# Patient Record
Sex: MALE | Race: BLACK OR AFRICAN AMERICAN | Employment: UNEMPLOYED | URBAN - METROPOLITAN AREA
[De-identification: names, ages, dates, MRNs, and addresses within clinical notes are randomized per-mention and may not be internally consistent; named-entity substitution may affect disease eponyms.]

---

## 2021-02-10 ENCOUNTER — HOSPITAL ENCOUNTER (EMERGENCY)
Age: 41
Discharge: HOME | End: 2021-02-11
Attending: EMERGENCY MEDICINE
Payer: COMMERCIAL

## 2021-02-10 DIAGNOSIS — R45.851 SUICIDAL IDEATION: Primary | ICD-10-CM

## 2021-02-10 LAB
AMPHET UR QL SCN: NEGATIVE
ANION GAP SERPL CALC-SCNC: 3 MMOL/L (ref 3–18)
BARBITURATES UR QL SCN: NEGATIVE
BASOPHILS # BLD: 0 K/UL (ref 0–0.1)
BASOPHILS NFR BLD: 0 % (ref 0–2)
BENZODIAZ UR QL: NEGATIVE
BUN SERPL-MCNC: 10 MG/DL (ref 7–18)
BUN/CREAT SERPL: 10 (ref 12–20)
CALCIUM SERPL-MCNC: 9.5 MG/DL (ref 8.5–10.1)
CANNABINOIDS UR QL SCN: POSITIVE
CHLORIDE SERPL-SCNC: 103 MMOL/L (ref 100–111)
CO2 SERPL-SCNC: 30 MMOL/L (ref 21–32)
COCAINE UR QL SCN: POSITIVE
COVID-19 RAPID TEST, COVR: NOT DETECTED
CREAT SERPL-MCNC: 1.01 MG/DL (ref 0.6–1.3)
DIFFERENTIAL METHOD BLD: NORMAL
EOSINOPHIL # BLD: 0.2 K/UL (ref 0–0.4)
EOSINOPHIL NFR BLD: 2 % (ref 0–5)
ERYTHROCYTE [DISTWIDTH] IN BLOOD BY AUTOMATED COUNT: 14.2 % (ref 11.6–14.5)
ETHANOL SERPL-MCNC: <3 MG/DL (ref 0–3)
GLUCOSE SERPL-MCNC: 90 MG/DL (ref 74–99)
HCT VFR BLD AUTO: 44.7 % (ref 36–48)
HDSCOM,HDSCOM: ABNORMAL
HGB BLD-MCNC: 14.3 G/DL (ref 13–16)
LYMPHOCYTES # BLD: 2.3 K/UL (ref 0.9–3.6)
LYMPHOCYTES NFR BLD: 26 % (ref 21–52)
MCH RBC QN AUTO: 30 PG (ref 24–34)
MCHC RBC AUTO-ENTMCNC: 32 G/DL (ref 31–37)
MCV RBC AUTO: 93.7 FL (ref 74–97)
METHADONE UR QL: NEGATIVE
MONOCYTES # BLD: 0.6 K/UL (ref 0.05–1.2)
MONOCYTES NFR BLD: 7 % (ref 3–10)
NEUTS SEG # BLD: 5.6 K/UL (ref 1.8–8)
NEUTS SEG NFR BLD: 65 % (ref 40–73)
OPIATES UR QL: NEGATIVE
PCP UR QL: NEGATIVE
PLATELET # BLD AUTO: 324 K/UL (ref 135–420)
PMV BLD AUTO: 9.4 FL (ref 9.2–11.8)
POTASSIUM SERPL-SCNC: 3.6 MMOL/L (ref 3.5–5.5)
RBC # BLD AUTO: 4.77 M/UL (ref 4.7–5.5)
SARS-COV-2, COV2: NORMAL
SODIUM SERPL-SCNC: 136 MMOL/L (ref 136–145)
SOURCE, COVRS: NORMAL
WBC # BLD AUTO: 8.6 K/UL (ref 4.6–13.2)

## 2021-02-10 PROCEDURE — 80048 BASIC METABOLIC PNL TOTAL CA: CPT

## 2021-02-10 PROCEDURE — 74011250637 HC RX REV CODE- 250/637: Performed by: PHYSICIAN ASSISTANT

## 2021-02-10 PROCEDURE — 82077 ASSAY SPEC XCP UR&BREATH IA: CPT

## 2021-02-10 PROCEDURE — 80307 DRUG TEST PRSMV CHEM ANLYZR: CPT

## 2021-02-10 PROCEDURE — 99285 EMERGENCY DEPT VISIT HI MDM: CPT

## 2021-02-10 PROCEDURE — 87635 SARS-COV-2 COVID-19 AMP PRB: CPT

## 2021-02-10 PROCEDURE — 85025 COMPLETE CBC W/AUTO DIFF WBC: CPT

## 2021-02-10 RX ORDER — LORAZEPAM 2 MG/ML
1 INJECTION INTRAMUSCULAR
Status: DISCONTINUED | OUTPATIENT
Start: 2021-02-10 | End: 2021-02-11 | Stop reason: HOSPADM

## 2021-02-10 RX ORDER — OLANZAPINE 5 MG/1
10 TABLET ORAL EVERY EVENING
Status: DISCONTINUED | OUTPATIENT
Start: 2021-02-10 | End: 2021-02-11 | Stop reason: HOSPADM

## 2021-02-10 RX ORDER — LORAZEPAM 2 MG/ML
2 INJECTION INTRAMUSCULAR
Status: DISCONTINUED | OUTPATIENT
Start: 2021-02-10 | End: 2021-02-11 | Stop reason: HOSPADM

## 2021-02-10 RX ORDER — LORAZEPAM 2 MG/ML
3 INJECTION INTRAMUSCULAR
Status: DISCONTINUED | OUTPATIENT
Start: 2021-02-10 | End: 2021-02-11 | Stop reason: HOSPADM

## 2021-02-10 RX ORDER — TRAZODONE HYDROCHLORIDE 50 MG/1
50 TABLET ORAL
Status: DISCONTINUED | OUTPATIENT
Start: 2021-02-10 | End: 2021-02-10

## 2021-02-10 RX ORDER — TRAZODONE HYDROCHLORIDE 50 MG/1
150 TABLET ORAL
Status: DISCONTINUED | OUTPATIENT
Start: 2021-02-10 | End: 2021-02-11 | Stop reason: HOSPADM

## 2021-02-10 RX ORDER — LORAZEPAM 1 MG/1
2 TABLET ORAL
Status: DISCONTINUED | OUTPATIENT
Start: 2021-02-10 | End: 2021-02-11 | Stop reason: HOSPADM

## 2021-02-10 RX ORDER — SODIUM CHLORIDE 0.9 % (FLUSH) 0.9 %
5-40 SYRINGE (ML) INJECTION AS NEEDED
Status: DISCONTINUED | OUTPATIENT
Start: 2021-02-10 | End: 2021-02-11 | Stop reason: HOSPADM

## 2021-02-10 RX ORDER — LORAZEPAM 1 MG/1
1 TABLET ORAL
Status: DISCONTINUED | OUTPATIENT
Start: 2021-02-10 | End: 2021-02-11 | Stop reason: HOSPADM

## 2021-02-10 RX ORDER — SODIUM CHLORIDE 0.9 % (FLUSH) 0.9 %
5-40 SYRINGE (ML) INJECTION EVERY 8 HOURS
Status: DISCONTINUED | OUTPATIENT
Start: 2021-02-10 | End: 2021-02-11 | Stop reason: HOSPADM

## 2021-02-10 RX ADMIN — OLANZAPINE 10 MG: 5 TABLET, FILM COATED ORAL at 18:14

## 2021-02-10 RX ADMIN — TRAZODONE HYDROCHLORIDE 150 MG: 50 TABLET ORAL at 22:02

## 2021-02-10 NOTE — ED PROVIDER NOTES
EMERGENCY DEPARTMENT HISTORY AND PHYSICAL EXAM    3:47 PM      Date: 2/10/2021  Patient Name: Li Roe    History of Presenting Illness     Chief Complaint   Patient presents with    Suicidal         History Provided By: Patient    Additional History (Context): Li Roe is a 38 y.o. male with No significant past medical history who presents with c/o suicidal ideation with a plan to \"jump off a bridge\". Pt notes he has felt this way for a year, but it has become worse over the past week. Notes auditory hallucinations telling him to hurt himself. Denies HI, visual hallucinations. Notes crack cocaine use, alcohol use daily. Notes he was previously admitted to [facility] about a year ago. Is not currently on any medication. PCP: None      Past History     Past Medical History:  History reviewed. No pertinent past medical history. Past Surgical History:  History reviewed. No pertinent surgical history. Family History:  History reviewed. No pertinent family history. Social History:  Social History     Tobacco Use    Smoking status: Current Every Day Smoker     Packs/day: 1.00     Years: 20.00     Pack years: 20.00    Smokeless tobacco: Never Used   Substance Use Topics    Alcohol use: Yes     Alcohol/week: 1.7 standard drinks     Types: 2 Cans of beer per week     Comment:  2 40's a week    Drug use: No       Allergies:  No Known Allergies      Review of Systems       Review of Systems   Constitutional: Negative for chills and fever. Respiratory: Negative for shortness of breath. Cardiovascular: Negative for chest pain. Gastrointestinal: Negative for abdominal pain, nausea and vomiting. Skin: Negative for rash. Neurological: Negative for weakness. Psychiatric/Behavioral: Positive for dysphoric mood and suicidal ideas. The patient is nervous/anxious. All other systems reviewed and are negative.         Physical Exam     Visit Vitals  /75 (BP 1 Location: Left upper arm, BP Patient Position: At rest)   Pulse 81   Resp 16   SpO2 98%         Physical Exam  Vitals signs and nursing note reviewed. Constitutional:       General: He is not in acute distress. Appearance: Normal appearance. He is well-developed. He is not ill-appearing, toxic-appearing or diaphoretic. HENT:      Head: Normocephalic and atraumatic. Neck:      Musculoskeletal: Normal range of motion and neck supple. Cardiovascular:      Rate and Rhythm: Normal rate and regular rhythm. Heart sounds: Normal heart sounds. No murmur. No friction rub. No gallop. Pulmonary:      Effort: Pulmonary effort is normal. No respiratory distress. Breath sounds: Normal breath sounds. No wheezing or rales. Musculoskeletal: Normal range of motion. Skin:     General: Skin is warm. Findings: No rash. Neurological:      Mental Status: He is alert. Psychiatric:         Attention and Perception: Attention normal.         Mood and Affect: Affect is blunt. Speech: Speech is delayed. Behavior: Behavior normal.         Thought Content: Thought content includes suicidal ideation. Thought content does not include homicidal ideation. Thought content includes suicidal plan. Thought content does not include homicidal plan.            Diagnostic Study Results     Labs -  Recent Results (from the past 12 hour(s))   DRUG SCREEN, URINE    Collection Time: 02/10/21  4:17 PM   Result Value Ref Range    BENZODIAZEPINES Negative NEG      BARBITURATES Negative NEG      THC (TH-CANNABINOL) Positive (A) NEG      OPIATES Negative NEG      PCP(PHENCYCLIDINE) Negative NEG      COCAINE Positive (A) NEG      AMPHETAMINES Negative NEG      METHADONE Negative NEG      HDSCOM (NOTE)    ETHYL ALCOHOL    Collection Time: 02/10/21  4:17 PM   Result Value Ref Range    ALCOHOL(ETHYL),SERUM <3 0 - 3 MG/DL   CBC WITH AUTOMATED DIFF    Collection Time: 02/10/21  4:17 PM   Result Value Ref Range    WBC 8.6 4.6 - 13.2 K/uL    RBC 4.77 4.70 - 5.50 M/uL    HGB 14.3 13.0 - 16.0 g/dL    HCT 44.7 36.0 - 48.0 %    MCV 93.7 74.0 - 97.0 FL    MCH 30.0 24.0 - 34.0 PG    MCHC 32.0 31.0 - 37.0 g/dL    RDW 14.2 11.6 - 14.5 %    PLATELET 237 238 - 380 K/uL    MPV 9.4 9.2 - 11.8 FL    NEUTROPHILS 65 40 - 73 %    LYMPHOCYTES 26 21 - 52 %    MONOCYTES 7 3 - 10 %    EOSINOPHILS 2 0 - 5 %    BASOPHILS 0 0 - 2 %    ABS. NEUTROPHILS 5.6 1.8 - 8.0 K/UL    ABS. LYMPHOCYTES 2.3 0.9 - 3.6 K/UL    ABS. MONOCYTES 0.6 0.05 - 1.2 K/UL    ABS. EOSINOPHILS 0.2 0.0 - 0.4 K/UL    ABS. BASOPHILS 0.0 0.0 - 0.1 K/UL    DF AUTOMATED     METABOLIC PANEL, BASIC    Collection Time: 02/10/21  4:17 PM   Result Value Ref Range    Sodium 136 136 - 145 mmol/L    Potassium 3.6 3.5 - 5.5 mmol/L    Chloride 103 100 - 111 mmol/L    CO2 30 21 - 32 mmol/L    Anion gap 3 3.0 - 18 mmol/L    Glucose 90 74 - 99 mg/dL    BUN 10 7.0 - 18 MG/DL    Creatinine 1.01 0.6 - 1.3 MG/DL    BUN/Creatinine ratio 10 (L) 12 - 20      GFR est AA >60 >60 ml/min/1.73m2    GFR est non-AA >60 >60 ml/min/1.73m2    Calcium 9.5 8.5 - 10.1 MG/DL   SARS-COV-2    Collection Time: 02/10/21  4:43 PM   Result Value Ref Range    SARS-CoV-2 Please find results under separate order     COVID-19 RAPID TEST    Collection Time: 02/10/21  4:43 PM   Result Value Ref Range    Specimen source Nasopharyngeal      COVID-19 rapid test Not detected NOTD         Radiologic Studies -   No orders to display         Medical Decision Making   I am the first provider for this patient. I reviewed the vital signs, available nursing notes, past medical history, past surgical history, family history and social history. Vital Signs-Reviewed the patient's vital signs.     Records Reviewed: Nursing Notes and Old Medical Records (Time of Review: 3:47 PM)    ED Course: Progress Notes, Reevaluation, and Consults:  5:53 PM: Discussed care with Dr. Raul Montoya, tele-psychiatrist. Voluntary admission, [facility] protocol, Zyprexa 10mg and Trazadone 50 mg qhs.     6:30 PM: Pt resting comfortably, pending dinner tray. PROGRESS NOTE:  8:00 PM:   Patient care will be transferred to Dr Marilee Lopez. Discussed available diagnostic results and care plan at length. Pending placement. Written by Annette Velez PA-C    Diagnosis     Clinical Impression:   1. Suicidal ideation        Disposition: transfer     Follow-up Information    None          Patient's Medications   Start Taking    No medications on file   Continue Taking    No medications on file   These Medications have changed    No medications on file   Stop Taking    DIAZEPAM (VALIUM) 5 MG TABLET    Take 1 Tab by mouth every eight (8) hours as needed (muscle spasm). TRAMADOL (ULTRAM) 50 MG TABLET    Take 1 Tab by mouth every six (6) hours as needed for Pain. Dictation disclaimer:  Please note that this dictation was completed with [employer], the computer voice recognition software. Quite often unanticipated grammatical, syntax, homophones, and other interpretive errors are inadvertently transcribed by the computer software. Please disregard these errors. Please excuse any errors that have escaped final proofreading.

## 2021-02-10 NOTE — ED NOTES
Pt states he is homeless and \"I think I would be better off dead\". Pt states he has been on medications in the past but never completed follow up care after release so ran out of meds.    Sitter at bedside, belongings secured 2 Bags in [place] #2 and envelope to security

## 2021-02-10 NOTE — CONSULTS
Name:  Phyllis Carpenter   :  1980  MRN  [id number]  Date:  2/10/21     Time:  5:03pm est  Location of patient: [facility] ED Location of doctor:  [facility]io [facility]o  Length of consult:  45min         This evaluation was conducted via telepsychiatry with the assistance of onsite staff      Chief Complaint: I want to kill myself.   History of Present Illness: 42yo homeless, unemployed, single male presented to ED with suicidal ideation, initially without a plan, then stated he has a plan to jump from a bridge. He reports s/I for the past year with worsening urges for the past few days. He tried to be admitted to [facility] but states he was refused. He has had AH for the past 2 years since starting meth, and AH resolve with abstinence (incarceration). He now c/o AH command to kill himself and that I should die.   The AH have worsened over several months. He denies VH but has paranoid ideation. He reports he has taken Remeron, trazodone, anxiety pill, pill for alcohol craving, and depression pill in the past but none for months. He denies any medication for psychosis. He is willing to come into the hospital for treatment of psychosis and is also open to alcohol/drug rehab. He has a contact with [facility].   Collateral: mother Roxann Cisneros [phone] no answer    Risks: homeless, limited support system, command AH, drug use  Protective factors: family emotional support, Gnosticist beliefs  OVERALL RISK: high due to AH, lethality, intent/motivation  SI/attempts/Self harm: passive suicidal thoughts for unknown time then suicidal ideation for 1 year, no prior attempts  HI/Violence/Property destruction: denies  Trauma history: denies  Sex/human trafficking: denies  Access to guns: denies  Legal: court date 3/19/21 for drugs; incarcerated a lot  Psychiatric History/Treatment History: inpatient treatment x 1, cant recall date, no follow-up  Drug/Alcohol History: crack since age 11, daily, [de-identified] lot, last use today; heroin since age 24 something and just started back, 2 caps/day, last 3 days ago; meth since 2019, [employer] daily, last use 3 days ago; began alcohol age 14, drinks all day, every day, as much as possible without DTs, sz, has withdrawal sx, last drink this morning; LPOS 18 months when incarcerated  Medical History: denies  Medications & Freq: none  Allergies: nkda  Sleep: Quantity: 1-2h/night Quality: interrupted  Family Psych History/History of suicide: unknown  Social History:    Housinghomeless for the past 1 year   Relationship status: single   Employment: restaurant, construction, shipyard, 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   Education: GED    Stressors/precipitants: housing, AH, unemployment, not being there for my son   Protective factors/supports: contact with mother, aunt, sister, brother, cousins; Gnosticism beliefs  Mental Status Exam:   Appearance and attire: appropriately dressed, of stated age, in NAD  Attitude and behavior: calm, cooperative  Speech: normal rate, pattern, flow  Affect and mood: down mood, appropriate/constricted affect  Association and thought processes: linear, logical, goal-directed  Thought perception: +AH command to kill himself and telling him he should die, no VH, +paranoid ideation  Thought content: no delusions, no obsessions   Sensorium, memory, and orientation: alert, oriented x 4, 3/3 objects immediately, 3/3 objects at 5 min, 7/7 days of the week forward, fair general fund of knowledge, concrete similarities  Intellectual functioning: average  Insight and judgment: fair/fair    Impression/Risk Assessment: 39yo single, unemployed, homeless male with one prior inpatient stay presents now with a 1-year history of AH, possibly related to regular drug use, and worsening of AH to command telling him to kill himself. He has a plan to jump from a bridge and intends to do so if released.   His last cocaine use was today, meth and heroin use was 3 days ago; he drinks heavily with no sobriety and last drink was today. He has been noncompliant with medication and follow-up. MSE is remarkable for command AH, paranoid ideation, and suicidal ideation with plan/intent. Cognitive function is intact. He agrees to take Zyprexa now, to inpatient hospitalization for treatment of psychosis, and to alcohol/drug rehab. Should he change his mind and want to leave before a bed is located, he would need to be re-evaluated for psychosis and suicidal ideation and possible need for TDO.     Diagnosis: 1) 298.9 (F29) unspecified schizophrenia spectrum and other psychotic disorder; 2) 303.90 (F10.20) alcohol use disorder, severe; 3)  292.9 unspecified stimulant-related disorder; 4) 292.9 unspecified opioid-related disorder    Treatment Recommendations: voluntary inpatient admission   Psychiatric Clearance: n/a  Observation level: standard  Pharmacological: start Zyprexa 10mg PO qhs now for AH; trazodone 150mg qhs prn for sleep; wants something for dental pain  Therapy: supportive  Level of Care: inpatient

## 2021-02-10 NOTE — ED TRIAGE NOTES
Suicidal   No plan just an urge. Now states he is going to jump off a bridge. States he has been committed before to [facility].      States he has been off his medication and now the urge to kill self is stong

## 2021-02-11 ENCOUNTER — HOSPITAL ENCOUNTER (INPATIENT)
Age: 41
LOS: 6 days | Discharge: OTHER INSTITUTION | DRG: 751 | End: 2021-02-17
Attending: PSYCHIATRY & NEUROLOGY | Admitting: PSYCHIATRY & NEUROLOGY
Payer: COMMERCIAL

## 2021-02-11 VITALS
TEMPERATURE: 98.3 F | DIASTOLIC BLOOD PRESSURE: 65 MMHG | OXYGEN SATURATION: 98 % | RESPIRATION RATE: 20 BRPM | HEART RATE: 89 BPM | SYSTOLIC BLOOD PRESSURE: 104 MMHG

## 2021-02-11 DIAGNOSIS — K08.89 TOOTH ACHE: Primary | ICD-10-CM

## 2021-02-11 PROBLEM — F20.9 SCHIZOPHRENIA (HCC): Status: ACTIVE | Noted: 2021-02-11

## 2021-02-11 PROCEDURE — 74011250637 HC RX REV CODE- 250/637: Performed by: PSYCHIATRY & NEUROLOGY

## 2021-02-11 PROCEDURE — 65220000003 HC RM SEMIPRIVATE PSYCH

## 2021-02-11 RX ORDER — HALOPERIDOL 5 MG/1
5 TABLET ORAL
Status: DISCONTINUED | OUTPATIENT
Start: 2021-02-11 | End: 2021-02-17 | Stop reason: HOSPADM

## 2021-02-11 RX ORDER — TRAZODONE HYDROCHLORIDE 50 MG/1
50 TABLET ORAL
Status: DISCONTINUED | OUTPATIENT
Start: 2021-02-11 | End: 2021-02-12

## 2021-02-11 RX ORDER — RISPERIDONE 2 MG/1
2 TABLET, FILM COATED ORAL
Status: DISCONTINUED | OUTPATIENT
Start: 2021-02-11 | End: 2021-02-12

## 2021-02-11 RX ORDER — HALOPERIDOL 5 MG/ML
5 INJECTION INTRAMUSCULAR
Status: DISCONTINUED | OUTPATIENT
Start: 2021-02-11 | End: 2021-02-17 | Stop reason: HOSPADM

## 2021-02-11 RX ORDER — HYDROXYZINE PAMOATE 50 MG/1
50 CAPSULE ORAL
Status: DISCONTINUED | OUTPATIENT
Start: 2021-02-11 | End: 2021-02-17 | Stop reason: HOSPADM

## 2021-02-11 RX ORDER — ACETAMINOPHEN 325 MG/1
650 TABLET ORAL
Status: DISCONTINUED | OUTPATIENT
Start: 2021-02-11 | End: 2021-02-17 | Stop reason: HOSPADM

## 2021-02-11 RX ADMIN — TRAZODONE HYDROCHLORIDE 50 MG: 50 TABLET ORAL at 22:57

## 2021-02-11 RX ADMIN — RISPERIDONE 2 MG: 2 TABLET ORAL at 20:48

## 2021-02-11 NOTE — PROGRESS NOTES
Psych's recommendation noted. Call placed to [facility] and spoke with , Yesenia Manning. Provided Che with pt's name and MRN and requested to have crisis worker call me back.            Estephanie Dawson, MSN, RN, ACM-RN   ED Outcomes Manager  [phone] (phone)

## 2021-02-11 NOTE — ED NOTES
Vitals:  Patient Vitals for the past 12 hrs:   Temp Pulse Resp BP SpO2   02/10/21 2158 97.7 °F (36.5 °C)       02/10/21 1754  81 16 115/75 98 %         Medications ordered:   Medications   sodium chloride (NS) flush 5-40 mL (0 mL IntraVENous Held 2/10/21 1752)   sodium chloride (NS) flush 5-40 mL (has no administration in time range)   LORazepam (ATIVAN) tablet 1 mg (has no administration in time range)     Or   LORazepam (ATIVAN) injection 1 mg (has no administration in time range)   LORazepam (ATIVAN) tablet 2 mg (has no administration in time range)     Or   LORazepam (ATIVAN) injection 2 mg (has no administration in time range)   LORazepam (ATIVAN) injection 3 mg (has no administration in time range)   OLANZapine (ZyPREXA) tablet 10 mg (10 mg Oral Given 2/10/21 1814)   traZODone (DESYREL) tablet 150 mg (150 mg Oral Given 2/10/21 2202)         Lab findings:  Recent Results (from the past 12 hour(s))   DRUG SCREEN, URINE    Collection Time: 02/10/21  4:17 PM   Result Value Ref Range    BENZODIAZEPINES Negative NEG      BARBITURATES Negative NEG      THC (TH-CANNABINOL) Positive (A) NEG      OPIATES Negative NEG      PCP(PHENCYCLIDINE) Negative NEG      COCAINE Positive (A) NEG      AMPHETAMINES Negative NEG      METHADONE Negative NEG      HDSCOM (NOTE)    ETHYL ALCOHOL    Collection Time: 02/10/21  4:17 PM   Result Value Ref Range    ALCOHOL(ETHYL),SERUM <3 0 - 3 MG/DL   CBC WITH AUTOMATED DIFF    Collection Time: 02/10/21  4:17 PM   Result Value Ref Range    WBC 8.6 4.6 - 13.2 K/uL    RBC 4.77 4.70 - 5.50 M/uL    HGB 14.3 13.0 - 16.0 g/dL    HCT 44.7 36.0 - 48.0 %    MCV 93.7 74.0 - 97.0 FL    MCH 30.0 24.0 - 34.0 PG    MCHC 32.0 31.0 - 37.0 g/dL    RDW 14.2 11.6 - 14.5 %    PLATELET 915 618 - 858 K/uL    MPV 9.4 9.2 - 11.8 FL    NEUTROPHILS 65 40 - 73 %    LYMPHOCYTES 26 21 - 52 %    MONOCYTES 7 3 - 10 %    EOSINOPHILS 2 0 - 5 %    BASOPHILS 0 0 - 2 %    ABS. NEUTROPHILS 5.6 1.8 - 8.0 K/UL    ABS. LYMPHOCYTES 2.3 0.9 - 3.6 K/UL    ABS. MONOCYTES 0.6 0.05 - 1.2 K/UL    ABS. EOSINOPHILS 0.2 0.0 - 0.4 K/UL    ABS. BASOPHILS 0.0 0.0 - 0.1 K/UL    DF AUTOMATED     METABOLIC PANEL, BASIC    Collection Time: 02/10/21  4:17 PM   Result Value Ref Range    Sodium 136 136 - 145 mmol/L    Potassium 3.6 3.5 - 5.5 mmol/L    Chloride 103 100 - 111 mmol/L    CO2 30 21 - 32 mmol/L    Anion gap 3 3.0 - 18 mmol/L    Glucose 90 74 - 99 mg/dL    BUN 10 7.0 - 18 MG/DL    Creatinine 1.01 0.6 - 1.3 MG/DL    BUN/Creatinine ratio 10 (L) 12 - 20      GFR est AA >60 >60 ml/min/1.73m2    GFR est non-AA >60 >60 ml/min/1.73m2    Calcium 9.5 8.5 - 10.1 MG/DL   SARS-COV-2    Collection Time: 02/10/21  4:43 PM   Result Value Ref Range    SARS-CoV-2 Please find results under separate order     COVID-19 RAPID TEST    Collection Time: 02/10/21  4:43 PM   Result Value Ref Range    Specimen source Nasopharyngeal      COVID-19 rapid test Not detected NOTD         EKG interpretation by ED Physician:      X-Ray, CT or other radiology findings or impressions:  No orders to display       Progress notes, Consult notes or additional Procedure notes:   Turned over to me by Carie Gottlieb to follow-up with case management placement for suicidal ideation. I reviewed the psychiatric note and meds have been written for along with diet  We will turn over to Dr. Rolf Aguirre in the morning approxi-6 AM to follow-up with placement    Reevaluation of patient:   stable with no acute medical condition that would prevent transfer to mental health facility    Disposition:  Diagnosis:   1. Suicidal ideation        Disposition: Stable    Follow-up Information    None           Patient's Medications   Start Taking    No medications on file   Continue Taking    No medications on file   These Medications have changed    No medications on file   Stop Taking    DIAZEPAM (VALIUM) 5 MG TABLET    Take 1 Tab by mouth every eight (8) hours as needed (muscle spasm).     TRAMADOL (ULTRAM) 50 MG TABLET    Take 1 Tab by mouth every six (6) hours as needed for Pain.     s

## 2021-02-11 NOTE — ED NOTES
Purposeful rounding completed:    Side rails up xBed in low position and wheels locked:   Call bell within reach:  Comfort addressed:   Toileting needs addressed: Plan of care reviewed/updated with patient and or family members:IV site assessed:   Pain assessed and addressed:

## 2021-02-11 NOTE — ED NOTES
Assumed care of this patient from Dr. Belinda Sol, patient pending placement. He's resting comfortably on stretcher in no acute distress. 1:32 PM - Case signed out to Dr. Corry Knight pending disposition.

## 2021-02-11 NOTE — ED NOTES
Purposeful rounding completed:    Side rails up x  Bed in low position and wheels locked:  Call bell within reach:   Comfort addressed:   Toileting needs addressed:  Plan of care reviewed/updated with patient and or family members:   IV site assessed:   Pain assessed and addressed:

## 2021-02-11 NOTE — ED NOTES
Pt provided meal tray. Pt asking for additional food. Pt given howie crackers and juice. I informed pt that I would modify his diet order to double portions.

## 2021-02-11 NOTE — PROGRESS NOTES
Call received from Grace at [facility] notifying me of pt's acceptance by Dr. Mary Carmen Concepcion. Pt is going to room 106, bed 2. Nursing to call report to [phone]. Notified Dr. Zakiya Bailey and charge nurse, Marry Mcintosh of the above.      Beni Mayer, MSN, RN, ACM-RN   ED Outcomes Manager  [phone] (phone)
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
## 2021-02-11 NOTE — ED NOTES
Patient endorsed to me as a signout from Dr. Power Licea at [phone] pending placement in an inpatient psychiatric facility for suicidal ideation. He has been accepted to [facility] under the care of Dr. Lazarus Mines. He remains stable, will continue to monitor.     Tasia Spann DO

## 2021-02-12 PROBLEM — F33.3 MAJOR DEPRESSIVE DISORDER, RECURRENT EPISODE, SEVERE, WITH PSYCHOSIS (HCC): Status: ACTIVE | Noted: 2021-02-12

## 2021-02-12 PROBLEM — F10.20 ALCOHOL USE DISORDER, SEVERE, DEPENDENCE (HCC): Status: ACTIVE | Noted: 2021-02-12

## 2021-02-12 PROBLEM — F10.930 ALCOHOL WITHDRAWAL SYNDROME WITHOUT COMPLICATION (HCC): Status: ACTIVE | Noted: 2021-02-12

## 2021-02-12 PROBLEM — F11.20 OPIOID USE DISORDER, MODERATE, DEPENDENCE (HCC): Status: ACTIVE | Noted: 2021-02-12

## 2021-02-12 PROBLEM — F14.20 COCAINE USE DISORDER, SEVERE, DEPENDENCE (HCC): Status: ACTIVE | Noted: 2021-02-12

## 2021-02-12 PROBLEM — F20.9 SCHIZOPHRENIA (HCC): Status: RESOLVED | Noted: 2021-02-11 | Resolved: 2021-02-12

## 2021-02-12 PROCEDURE — HZ2ZZZZ DETOXIFICATION SERVICES FOR SUBSTANCE ABUSE TREATMENT: ICD-10-PCS | Performed by: PSYCHIATRY & NEUROLOGY

## 2021-02-12 PROCEDURE — 65220000003 HC RM SEMIPRIVATE PSYCH

## 2021-02-12 PROCEDURE — 99223 1ST HOSP IP/OBS HIGH 75: CPT | Performed by: PSYCHIATRY & NEUROLOGY

## 2021-02-12 PROCEDURE — 74011250637 HC RX REV CODE- 250/637: Performed by: PSYCHIATRY & NEUROLOGY

## 2021-02-12 RX ORDER — RISPERIDONE 1 MG/1
1 TABLET, FILM COATED ORAL
Status: DISCONTINUED | OUTPATIENT
Start: 2021-02-12 | End: 2021-02-15

## 2021-02-12 RX ORDER — LORAZEPAM 1 MG/1
2 TABLET ORAL
Status: DISCONTINUED | OUTPATIENT
Start: 2021-02-12 | End: 2021-02-17 | Stop reason: HOSPADM

## 2021-02-12 RX ORDER — LORAZEPAM 1 MG/1
1 TABLET ORAL
Status: DISCONTINUED | OUTPATIENT
Start: 2021-02-12 | End: 2021-02-17 | Stop reason: HOSPADM

## 2021-02-12 RX ORDER — THERA TABS 400 MCG
1 TAB ORAL DAILY
Status: DISCONTINUED | OUTPATIENT
Start: 2021-02-13 | End: 2021-02-17 | Stop reason: HOSPADM

## 2021-02-12 RX ORDER — BUPROPION HYDROCHLORIDE 150 MG/1
150 TABLET ORAL DAILY
Status: DISCONTINUED | OUTPATIENT
Start: 2021-02-13 | End: 2021-02-17 | Stop reason: HOSPADM

## 2021-02-12 RX ORDER — LANOLIN ALCOHOL/MO/W.PET/CERES
100 CREAM (GRAM) TOPICAL DAILY
Status: DISCONTINUED | OUTPATIENT
Start: 2021-02-13 | End: 2021-02-12

## 2021-02-12 RX ORDER — FOLIC ACID 1 MG/1
1 TABLET ORAL DAILY
Status: DISCONTINUED | OUTPATIENT
Start: 2021-02-13 | End: 2021-02-17 | Stop reason: HOSPADM

## 2021-02-12 RX ORDER — SERTRALINE HYDROCHLORIDE 50 MG/1
50 TABLET, FILM COATED ORAL DAILY
Status: DISCONTINUED | OUTPATIENT
Start: 2021-02-12 | End: 2021-02-12

## 2021-02-12 RX ORDER — LANOLIN ALCOHOL/MO/W.PET/CERES
100 CREAM (GRAM) TOPICAL DAILY
Status: DISCONTINUED | OUTPATIENT
Start: 2021-02-13 | End: 2021-02-17 | Stop reason: HOSPADM

## 2021-02-12 RX ADMIN — LORAZEPAM 2 MG: 1 TABLET ORAL at 21:31

## 2021-02-12 RX ADMIN — TRAZODONE HYDROCHLORIDE 150 MG: 100 TABLET ORAL at 20:42

## 2021-02-12 RX ADMIN — SERTRALINE HYDROCHLORIDE 50 MG: 50 TABLET ORAL at 12:04

## 2021-02-12 RX ADMIN — RISPERIDONE 1 MG: 1 TABLET ORAL at 20:42

## 2021-02-12 RX ADMIN — LORAZEPAM 2 MG: 1 TABLET ORAL at 16:29

## 2021-02-12 NOTE — GROUP NOTE
DOROTHY  GROUP DOCUMENTATION INDIVIDUAL Group Therapy Note Date: 2/11/2021 Group Start Time: 80 Group End Time: 2000 Group Topic: Hygiene [facility] 1 SPECIAL TRTMT 1 TRIP Al[place]  GROUP DOCUMENTATION GROUP Group Therapy Note Attendees: 3 Attendance: Did not attend Additional Notes:  Patent is asleep Ilir Arechiga RN

## 2021-02-12 NOTE — BH NOTES
DUANEH.T. Note:-S/O-The above pt has been in the bed majority of the shift. He has been only out for meals and medication his shift. He has been receptive of medication his shift. He has only been out for meals and medication this shift. He verbally contract for safety and denies any self-harm at this time.

## 2021-02-12 NOTE — H&P
Psychiatry History and Physical    Subjective:     Date of Evaluation:  2/12/2021    Reason for Referral:  Sruthi Barros was referred to the examiners from ED/[facility]aul for SI with plan to jump and AH and polysubstance abuse/Depression . History of Presenting Problem: 41y/o AA casarez in NAD well Developed and nourished. First time catherine to MV/Psych Adits to SI but denies HI/AHVH. Denies physical symptoms. Medical probles: Depression, VARGHESE, Bipolar, ADHD and polysubstance abuse    Patient Active Problem List    Diagnosis Date Noted    Major depressive disorder, recurrent episode, severe, with psychosis ([address] 02/12/2021    Alcohol use disorder, severe, dependence ([facility] [address] 02/12/2021    Alcohol withdrawal syndrome without complication ([facility] [address] 84/54/4655    Cocaine use disorder, severe, dependence ([address] 02/12/2021    Opioid use disorder, moderate, dependence ([facility] [address] 02/12/2021     No past medical history on file. No past surgical history on file. No family history on file. Social History     Tobacco Use    Smoking status: Current Every Day Smoker     Packs/day: 1.00     Years: 20.00     Pack years: 20.00    Smokeless tobacco: Never Used   Substance Use Topics    Alcohol use:  Yes     Alcohol/week: 1.7 standard drinks     Types: 2 Cans of beer per week     Comment:  2 40's a week     Prior to Admission medications    Not on File     No Known Allergies     Review of Systems - History obtained from chart review and the patient  General ROS: negative  Psychological ROS: positive for - depression  Ophthalmic ROS: negative  ENT ROS: negative  Respiratory ROS: no cough, shortness of breath, or wheezing  Cardiovascular ROS: no chest pain or dyspnea on exertion  Gastrointestinal ROS: no abdominal pain, change in bowel habits, or black or bloody stools  Genito-Urinary ROS: no dysuria, trouble voiding, or hematuria  Musculoskeletal ROS: negative  Neurological ROS: no TIA or stroke symptoms  Dermatological ROS: negative      Objective:     No data found. Mental Status exam: WNL except for    Sensorium  oriented to time, place and person   Orientation person, place, time/date and situation   Relations cooperative   Eye Contact appropriate   Appearance:  age appropriate and within normal Limits   Motor Behavior:  gait stable and within normal limits   Speech:  normal volume   Vocabulary average   Thought Process: logical   Thought Content free of delusions and free of hallucinations   Suicidal ideations intention   Homicidal ideations no plan  and no intention   Mood:  depressed   Affect:  depressed   Memory recent  adequate   Memory remote:  adequate   Concentration:  adequate   Abstraction:  concrete   Insight:  good   Reliability fair   Judgment:  fair         Physical Exam:   Visit Vitals  /76 (BP 1 Location: Right upper arm, BP Patient Position: At rest)   Pulse 92   Temp 97.2 °F (36.2 °C)   Resp 20     General appearance: alert, cooperative, no distress, appears stated age  Head: Normocephalic, without obvious abnormality, atraumatic  Eyes: negative  Ears: normal TM's and external ear canals AU  Nose: Nares normal. Septum midline. Mucosa normal. No drainage or sinus tenderness. Throat: Lips, mucosa, and tongue normal. Teeth and gums normal  Neck: supple, symmetrical, trachea midline, no adenopathy, thyroid: not enlarged, symmetric, no tenderness/mass/nodules, no carotid bruit and no JVD  Back: symmetric, no curvature. ROM normal. No CVA tenderness. Lungs: clear to auscultation bilaterally  Chest wall: no tenderness  Heart: regular rate and rhythm, S1, S2 normal, no murmur, click, rub or gallop  Abdomen: soft, non-tender.  Bowel sounds normal. No masses,  no organomegaly  Extremities: extremities normal, atraumatic, no cyanosis or edema  Pulses: 2+ and symmetric  Skin: Skin color, texture, turgor normal. No rashes or lesions  Lymph nodes: Cervical, supraclavicular, and axillary nodes normal.  Neurologic: Grossly normal        Impression:      Principal Problem:    Major depressive disorder, recurrent episode, severe, with psychosis ([address] (2/12/2021)    Active Problems:    Alcohol use disorder, severe, dependence ([address] (2/12/2021)      Alcohol withdrawal syndrome without complication ([address] (7/94/2137)      Cocaine use disorder, severe, dependence ([address] (2/12/2021)      Opioid use disorder, moderate, dependence ([address] (2/12/2021)          Plan:     Recommendations for Treatment/Conditions:  Psychiatric treatment recommended while in hospital  Admit to Psych services for SI and Depression and polysubstance     Referral To:    Inpatient psychiatric care      Sadiq HAMMOND [id number] Laird   2/12/2021 4:44 PM

## 2021-02-12 NOTE — BSMART NOTE
SOCIAL WORK GROUP THERAPY PROGRESS NOTE Group Time:  10am 
 
Group Topic:  Coping Skills Group Participation:  
 
Pt reportedly did not attend group due to medical issues

## 2021-02-12 NOTE — PROGRESS NOTES
Problem: Falls - Risk of  Goal: *Absence of Falls  Description: Document [place]via Fall Risk and appropriate interventions in the flowsheet every shift, and will be free of falls throughout hospitalization. Outcome: Progressing Towards Goal  Note: Fall Risk Interventions:            Medication Interventions: Teach patient to arise slowly              Aeb pt free of falls this shift     Problem: Suicide  Goal: *STG: Remains safe in hospital  Description: Pt will be free of SI, intent, or plan as well as self-injurious behavior every shift throughout hospitalization. Outcome: Progressing Towards Goal  Note: Aeb pt free from harm this shift  Goal: *STG/LTG: Complies with medication therapy  Description: Pt will take medications as prescribed every shift throughout hospitalization, and will be treated with PRN medication as needed throughout hospitalization. Outcome: Progressing Towards Goal  Note: Aeb pt taking all meds as prescribed this shift     Calm and cooperative, appropriate with staff and peers. Reports daily EtOH consumption \"all day long\" -- states drinks beer, wine, liquor, anything he can get his hands on. Appears moderately tremulous, with some paroxysmal sweat visible, mental fogginess, inability to fall asleep, and reportsignificant anxiety and agitation. Behaviorally -- he does not appear agitated. He received two PRN doses of lorazepam 2mg PO for CIWA scores of 12 or 13. He reports SI and command AH telling him to hurt himself; however, he feels safe at this facility and contracts for safety, promising to alert staff if these feeling arise. Denies HI/VH. Compliant with scheduled meds. Will continue to monitor for safety.

## 2021-02-12 NOTE — BSMART NOTE
ART THERAPY GROUP PROGRESS NOTE Group [id number] The patient did not awaken/get up when called for group.

## 2021-02-12 NOTE — GROUP NOTE
DOROTHY NICHOLE GROUP DOCUMENTATION INDIVIDUAL Group Therapy Note Date: 2/12/2021 Group Start Time: 5720 Group End Time: 7800 Group Topic: Nursing [facility] 1 SPECIAL TRTMT 1 TRIP [place] GROUP DOCUMENTATION GROUP Group Therapy Note Attendees: 3 Attendance: Did not attend Marisa Ortiz RN

## 2021-02-12 NOTE — H&P
[address]  Inpatient Admission Note    Date of Service:  02/12/21    Historian(s): Oracio Arthur and chart review  Referral Source: [facility]    Chief Complaint   Suicidal ideation    History of Present Illness     Oracio Arthur is a 38 y.o. [address] male with a history of multiple substance use disorders (cocaine, alcohol, cannabis, heroin, meth) who was admitted voluntarily from [facility]/\[facility]\"" [facility] for suicidal ideation with plan to jump off a bridge. Patient also reported auditory hallucinations of voices telling him to kill himself, telling him he was worthless and had nothing to live for. He was seen by psychiatrist virtually at [facility]/\[facility]\"" Cigital and inpatient hospitalization was recommended. His UDS was positive for cocaine and THC. BAL <3. The patient reports that he has been using drugs for several years. He started using cocaine at the age of 11. Cocaine is his drug of choice. He currently is using cocaine daily, about $250 worth daily. Last use was 2 days ago. He also drinks alcohol \" every day, all day\". His last drink was 2 days ago. He usually drinks at least 24 beers plus a fifth of liquor. He started drinking at the age of 14. He says he is currently experiencing withdrawal symptoms such as tremors, night sweats, insomnia and vomiting. He denies ever having seizures due to alcohol withdrawal.  He is also seeing \"flashes of light\". Patient states he started using meth about a year ago but has been using more frequently in the past 2 months. He uses at least $100 worth every 2 to 3 days. He last used 3 days ago. He also started using heroin about a year ago and is using 2 caps daily. Last used 3 days ago. Patient also smokes marijuana and started at the age of 14. He also smokes 1 pack of cigarettes daily.   The patient reports that about a year ago he was introduced to meth and heroin because \"it was free\" and he had easy access. He started using it to replace cocaine when he was not able to afford cocaine. 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 The patient went to [facility] detox for 7 days a year ago. He says he was [facility] [place] [address] which he tried to do but found that most of the people there were drinking and using drugs and it was a trigger for him. He says he did not find outpatient treatment helpful and he needs to go to a residential inpatient treatment facility for treatment of his substance use disorders. Mr. Mandy Alva reports that he has been feeling depressed for the past 3 years. Depressed mood began when he broke up with his girlfriend 3 years ago and also had limited access to his son. He has a 66-year-old son whom he says he is not in contact with because of his lifestyle. He has a lot of guilt and shame about his drug addiction and feels that he cannot be a part of his son's life in his current state. However, he says that his son is his motivation and he is tired of doing drugs and his current lifestyle. He wants to stop so that he can be there for his son. The patient endorses trouble with sleep. He says in the past has been prescribed trazodone 150 mg which helps with insomnia. He also complains of fatigue, feelings of worthlessness and helplessness. Medical Review of Systems     Constitutional: No fever or chills. All other systems reviewed and are negative. Psychiatric Treatment History     The patient has 1 prior inpatient hospitalization to [facility] last year. He says he was prescribed trazodone 150 mg at bedtime. He currently is not involved in any outpatient treatment. Allergies    No Known Allergies    Medical History     No past medical history on file.     None    Medication(s)     None       Family History     No family history on file. Psychiatric Family History  The patient denies family history of mental illness or substance use disorders. Social History     He was born in [place] and raised in [place] by his mother. He is the youngest of 5 children. He says he has a distant relationship with his siblings but he is close to his mother. He has a GED. He is single. Currently homeless and unemployed. In the past he has worked at [employer], in [place]. He has a 58-year-old son who lives in [place]. Patient also reports that he was incarcerated for 10 years from 3491-9149 in [place] for armed robbery. He denies being on probation. He says he has pending charges for possession of drug paraphernalia and Scheduled II substances. His court date is on March 19.     Vitals/Labs      Vitals:    02/11/21 1628 02/12/21 0806   BP: 108/78 116/76   Pulse: 79 92   Resp: 18 20   Temp: 97.6 °F (36.4 °C) 97.2 °F (36.2 °C)       Labs:   Results for orders placed or performed during the hospital encounter of 02/10/21   COVID-19 RAPID TEST   Result Value Ref Range    Specimen source Nasopharyngeal      COVID-19 rapid test Not detected NOTD     DRUG SCREEN, URINE   Result Value Ref Range    BENZODIAZEPINES Negative NEG      BARBITURATES Negative NEG      THC (TH-CANNABINOL) Positive (A) NEG      OPIATES Negative NEG      PCP(PHENCYCLIDINE) Negative NEG      COCAINE Positive (A) NEG      AMPHETAMINES Negative NEG      METHADONE Negative NEG      HDSCOM (NOTE)    ETHYL ALCOHOL   Result Value Ref Range    ALCOHOL(ETHYL),SERUM <3 0 - 3 MG/DL   CBC WITH AUTOMATED DIFF   Result Value Ref Range    WBC 8.6 4.6 - 13.2 K/uL    RBC 4.77 4.70 - 5.50 M/uL    HGB 14.3 13.0 - 16.0 g/dL    HCT 44.7 36.0 - 48.0 %    MCV 93.7 74.0 - 97.0 FL    MCH 30.0 24.0 - 34.0 PG    MCHC 32.0 31.0 - 37.0 g/dL    RDW 14.2 11.6 - 14.5 %    PLATELET 467 116 - 513 K/uL    MPV 9.4 9.2 - 11.8 FL    NEUTROPHILS 65 40 - 73 %    LYMPHOCYTES 26 21 - 52 %    MONOCYTES 7 3 - 10 %    EOSINOPHILS 2 0 - 5 %    BASOPHILS 0 0 - 2 %    ABS. NEUTROPHILS 5.6 1.8 - 8.0 K/UL    ABS. LYMPHOCYTES 2.3 0.9 - 3.6 K/UL    ABS. MONOCYTES 0.6 0.05 - 1.2 K/UL    ABS. EOSINOPHILS 0.2 0.0 - 0.4 K/UL    ABS. BASOPHILS 0.0 0.0 - 0.1 K/UL    DF AUTOMATED     METABOLIC PANEL, BASIC   Result Value Ref Range    Sodium 136 136 - 145 mmol/L    Potassium 3.6 3.5 - 5.5 mmol/L    Chloride 103 100 - 111 mmol/L    CO2 30 21 - 32 mmol/L    Anion gap 3 3.0 - 18 mmol/L    Glucose 90 74 - 99 mg/dL    BUN 10 7.0 - 18 MG/DL    Creatinine 1.01 0.6 - 1.3 MG/DL    BUN/Creatinine ratio 10 (L) 12 - 20      GFR est AA >60 >60 ml/min/1.73m2    GFR est non-AA >60 >60 ml/min/1.73m2    Calcium 9.5 8.5 - 10.1 MG/DL   SARS-COV-2   Result Value Ref Range    SARS-CoV-2 Please find results under separate order         Mental Status Examination     Appearance/Hygiene 40 y.o. BLACK OR  male  Hygiene: Dressed in hospital scrubs   Behavior/Social Relatedness  appropriate, cooperative   Musculoskeletal Gait/Station: appropriate  Tone (flaccid, cogwheeling, spastic): Normal  Psychomotor (hyperkinetic, hypokinetic): calm  Involuntary movements (tics, dyskinesias, akathisa, stereotypies): none   Speech   Rate, rhythm, volume, fluency and articulation are appropriate   Mood   depressed, sad   Affect    congruent, tearful   Thought Process Linear and goal directed with tight associations       Thought Content and Perceptual Disturbances   Denies self-injurious behavior (SIB), suicidal ideation (SI), aggressive behavior or homicidal ideation (HI)    Endorses auditory and visual hallucinations   Sensorium and Cognition  A&Ox4, attention intact, concentration intact memory intact, language use appropriate, and fund of knowledge age appropriate   Insight  fair   Judgment fair       Suicide Risk Assessment     Admission  Date/Time: 02/12/21    [x] Admission  [] Discharge     Key Factors:   Current admission  precipitated by suicide attempt? []  Yes     2    [x]  No     1     Suicide Attempt History  [] Past attempts of high lethality    2 []  Past attempts of low lethality    1 [x]  No previous attempts       0   Suicidal Ideation []  Constant suicidal thoughts      2 []  Intermittent or fleeting suicidal  thoughts  1 [x]  Denies current suicidal thoughts    0   Suicide Plan   []  Has plan with actual OR potential access to planned method    2 []  Has plan without access to planned method      1 []  No plan            0   Plan Lethality []  Highly lethal plan (Carbon monoxide, gun, hanging, jumping)    2 []  Moderate lethality of plan          1 []  Low lethality of plan (biting, head banging, superficial scratching, pillow over face)  0   Safety Plan Agreement  []  Unwilling OR unable to agree due to impaired reality testing   2   []  Patient is ambivalent and/or guarded      1 [x]  Reliably agrees        0   Current Morbid Thoughts (reunion fantasies, preoccupations with death) []  Constantly     2     []  Frequently    1 [x]  Rarely    0   Elopement Risk  []  High risk     2 []  Moderate risk    1 [x]   Low risk    0   Symptoms    [x]  Hopeless  [x]  Helpless  []  Anhedonia   []  Guilt/shame  []  Anger/rage  [x]  Anxiety  [x]  Insomnia   []  Agitation   []  Impulsivity  [x]  5-6 symptoms present    2 []  3-4 symptoms present    1  []  0-2 symptoms present    0     Total Score: 3  --------------------------------------------------------------------------------------------------------------  Subjective Appraisal of Risk:  []  Patient replies not trustworthy: several non-verbal cues. []  Patient replies questionable: trustworthy: at least 1 non-verbal cue. [x]  Patient replies appear trustworthy.     Protective measures (select all that apply):  []  Successful past responses to stress  []  Spiritual/Christianity beliefs  [x]  Capacity for reality testing  []  Positive therapeutic relationships  [x]  Social supports/connections  []  Positive coping skills  []  Frustration tolerance/optimism  []  Children or pets in the home  [x]  Sense of responsibility to family  [x]  Agrees to treatment plan and follow up    High Risk Diagnoses (select all that apply):  [x]  Depression/Bipolar Disorder  [x]  Dual Diagnosis  []  Cardiovascular Disease  []  Schizophrenia  []  Chronic Pain  []  Epilepsy  []  Cancer  []  Personality Disorder  []  HIV/AIDS  []  Multiple Sclerosis    Dangerousness Assessment (Suicide, homicide, property destruction. ..)    Risk Factors reviewed and risk assessed to be:  [] low  [] low-moderate  [] moderate   [x] moderate-high  [] high     Protection factors reviewed and risk assessed to be:  [x] low  [] low-moderate  [] moderate   [] moderate-high  [] high     Response to treatment and risk assessed to be:  [x] low  [] low-moderate  [] moderate   [] moderate-high  [] high     Support reviewed and risk assessed to be:  [x] low  [] low-moderate  [] moderate   [] moderate-high  [] high     Acceptance of Discharge and outpatient treatment reviewed and risk assessed to be:    [x] low  [] low-moderate  [] moderate   [] moderate-high  [] high   Overall risk assessed to be:  [] low  [] low-moderate  [] moderate   [x] moderate-high  [] high       Assessment and Plan     Psychiatric Diagnoses:   Patient Active Problem List   Diagnosis Code    Major depressive disorder, recurrent episode, severe, with psychosis ([facility] F33.3    Alcohol use disorder, severe, dependence ([address] F10.20    Alcohol withdrawal syndrome without complication ([address] R53.137    Cocaine use disorder, severe, dependence ([address] F14.20    Opioid use disorder, moderate, dependence ([address] F11.20       Level of impairment/disability: Severe    Surendra Sanches is a 38 y.o. who is currently requiring acute stabilization after endorsing suicidal ideation with plan.     1. Major Depressive Disorder: Admit to [facility] inpatient behavioral health unit. Start milieu, group, art and occupation therapy. Start Wellbutrin  mg mg daily for mood. 2. Psychosis: Start risperidone 1 mg at bedtime. Lipid panel and A1c in the morning. 3. Alcohol withdrawal: Monitor for alcohol withdrawal with CIWA. Ativan as needed based on CIWA score. Start thiamine 100 mg daily and multivitamin daily. 4. Cocaine/Opioid Use Disorder: Motivational interviewing, encourage cessation, referral to residential treatment center. 5. Start disposition planning; Tentative date of discharge: 5-7 days. Behavioral Services  Medicare Certification Upon Admission    I certify that this patient's inpatient psychiatric hospital admission is medically necessary for:      [x] Treatment which could reasonably be expected to improve this patient's condition,       [] For diagnostic study;     AND     [x] The inpatient psychiatric services are provided while the individual is under the care of a physician and are included in the individualized plan of care.     Estimated length of stay/service 7 days    Plan for post-hospital care Outpatient treatment         Odalys Calvillo MD  Psychiatrist  [facility]

## 2021-02-12 NOTE — BSMART NOTE
[facility] Biopsychosocial Assessment Current Level of Psychosocial Functioning  
 
[x]Independent 
[]Dependent []Minimal Assist 
 
 
Comments:   
 
Psychosocial High Risk Factors (check all that apply) []Unable to obtain meds []Chronic illness/pain   
[x]Substance abuse  
[]Lack of Family Support []Financial stress []Isolation []Inadequate [employer] [x]Suicide attempt(s) []Not taking medications []Victim of crime []Developmental Delay 
[]Unable to manage personal needs []Age 72 or older  
[]  Homeless []José Luis transportation []Readmission within 30 days []Unemployment []Traumatic Event Psychiatric Advanced Directive: None reported by patient Family to involve in treatment: No family to involve in treatment. Sexual Orientation: Patient reports he is heterosexual.  
 
Patient Strengths: Unknown at this time. Patient Barriers: Patient has limited resources Opiate education provided: Patient is encouraged to address concerns in psycho education and chemical dependency group. Safety plan: Patient is able to contract for safety. CMHC/ history: Diagnosis: 1) 298.9 (F29) unspecified schizophrenia spectrum and other psychotic disorder; 2) 303.90 (F10.20) alcohol use disorder, severe; 3)  292.9 unspecified stimulant-related disorder; 4) 292.9 unspecified opioid-related disorder Plan of Care: 
medication management, group/individual therapies, family meetings, psycho -education, treatment team meetings to assist with stabilization Initial Discharge Plan: Patient reports he is requesting long term treatment services. Clinical Summary:  Sruthi Barros is a 38 y.o. male with No significant past medical history who presents with c/o suicidal ideation with a plan to \"jump off a bridge\". Pt notes he has felt this way for a year, but it has become worse over the past week. Notes auditory hallucinations telling him to hurt himself. Denies HI, visual hallucinations. Notes crack cocaine use, alcohol use daily. Notes he was previously admitted to [facility] about a year ago. Is not currently on any medication. SW made contact with patient as he engaged with peers in the milieu. Patient reports the need for further treatment to address his issues with substances. Patient is encouraged to engage in psycho education and chemical dependency program to address stressors and triggers.  
 
Eliane Baum, JAROCHO-E

## 2021-02-13 PROCEDURE — 65220000003 HC RM SEMIPRIVATE PSYCH

## 2021-02-13 PROCEDURE — 74011250637 HC RX REV CODE- 250/637: Performed by: PSYCHIATRY & NEUROLOGY

## 2021-02-13 PROCEDURE — 99231 SBSQ HOSP IP/OBS SF/LOW 25: CPT | Performed by: PSYCHIATRY & NEUROLOGY

## 2021-02-13 RX ADMIN — TRAZODONE HYDROCHLORIDE 150 MG: 100 TABLET ORAL at 20:20

## 2021-02-13 RX ADMIN — THERA TABS 1 TABLET: TAB at 08:14

## 2021-02-13 RX ADMIN — RISPERIDONE 1 MG: 1 TABLET ORAL at 20:20

## 2021-02-13 RX ADMIN — Medication 100 MG: at 08:14

## 2021-02-13 RX ADMIN — LORAZEPAM 1 MG: 1 TABLET ORAL at 16:55

## 2021-02-13 RX ADMIN — HALOPERIDOL 5 MG: 5 TABLET ORAL at 16:55

## 2021-02-13 RX ADMIN — FOLIC ACID 1 MG: 1 TABLET ORAL at 08:14

## 2021-02-13 RX ADMIN — LORAZEPAM 1 MG: 1 TABLET ORAL at 20:22

## 2021-02-13 RX ADMIN — BUPROPION HYDROCHLORIDE 150 MG: 150 TABLET, FILM COATED, EXTENDED RELEASE ORAL at 08:14

## 2021-02-13 RX ADMIN — ACETAMINOPHEN 650 MG: 325 TABLET ORAL at 08:18

## 2021-02-13 NOTE — PROGRESS NOTES
[address] [facility] [address]  Inpatient Progress Note     Date of Service: 02/13/21  Hospital Day: 2     Subjective/Interval History   02/13/21    Treatment Team Notes:  Notes reviewed and/or discussed and report that Jessie Jamil is a patient recently admitted after he presented himself to [facility] emergency department. The patient has a chronic history of polysubstance abuse, described himself as depressed with the presence of ego-dystonic auditory hallucinations telling him to harm himself. Patient interview: Jessie Jamil was interviewed by this writer today. The patient described feeling better today. He has required the prescription for lorazepam following the [facility] protocol for alcohol detox. He is being provided treatment combination with risperidone and Wellbutrin XL. Labs were reviewed. I did not find any evidence that hepatic function panel was obtained. Since he is being prescribed with an atypical, will be requesting in addition an A1c and a lipid panel. For completeness a TSH will be requested also. Objective     Visit Vitals  /81 (BP 1 Location: Right lower arm, BP Patient Position: Sitting)   Pulse 91   Temp 97.8 °F (36.6 °C)   Resp 18   SpO2 98%     Vitals are stable    No results found for this or any previous visit (from the past 24 hour(s)). Mental Status Examination     Appearance/Hygiene 38 y.o.  BLACK OR  male  Hygiene: Fair   Behavior/Social Relatedness Appropriate   Musculoskeletal Gait/Station: appropriate  Tone (flaccid, cogwheeling, spastic): not assessed  Psychomotor (hyperkinetic, hypokinetic): calm   Involuntary movements (tics, dyskinesias, akathisa, stereotypies): none   Speech   Rate, rhythm, volume, fluency and articulation are appropriate   Mood   depression is improving   Affect    appropriate to situation   Thought Process Linear and goal directed   Thought Content and Perceptual Disturbances Denies self-injurious behavior (SIB), suicidal ideation (SI), aggressive behavior or homicidal ideation (HI), however he describes himself as being suicidal when he was evaluated at the emergency room. Auditory hallucinations were ego-dystonic and commanding. They are decreasing intensity and are less frequent   Sensorium and Cognition  Grossly intact   Insight  improving   Judgment  improving        Assessment/Plan      Psychiatric Diagnoses:   Patient Active Problem List   Diagnosis Code    Major depressive disorder, recurrent episode, severe, with psychosis ([address] F33.3    Alcohol use disorder, severe, dependence ([address] F10.20    Alcohol withdrawal syndrome without complication ([address] Y79.161    Cocaine use disorder, severe, dependence ([address] F14.20    Opioid use disorder, moderate, dependence ([address] F11.20       Medical Diagnoses: The patient has a chronic history of drug use including alcohol and cocaine. Hepatic function panel has been requested. Psychosocial and contextual factors: See admission note. Level of impairment/disability: Moderately severe    1. The patient has required the administration of lorazepam following the [facility] protocol. He is currently tolerating the combination of bupropion and risperidone well. Since an atypical has been prescribed we will be requesting an A1c and lipid panel and for completeness will also requested TSH. The studies will be drawn tomorrow. 2.  Reviewed instructions, risks, benefits and side effects of medications  3.   Disposition/Discharge Date: self-care/home, to be determined    Yadi Presley MD, [address] [place] [address]  Psychiatry

## 2021-02-13 NOTE — PROGRESS NOTES
Problem: Falls - Risk of  Goal: *Absence of Falls  Description: Document Suzie Daniel Fall Risk and appropriate interventions in the flowsheet every shift, and will be free of falls throughout hospitalization. Outcome: Progressing Towards Goal  Note: Fall Risk Interventions:  Medication Interventions: Teach patient to arise slowly  Pt remains free of falls. Problem: Suicide  Goal: *STG: Remains safe in hospital  Description: Pt will be free of SI, intent, or plan as well as self-injurious behavior every shift throughout hospitalization. Outcome: Progressing Towards Goal  Note: Pt remains free of self harm. Goal: *STG/LTG: Complies with medication therapy  Description: Pt will take medications as prescribed every shift throughout hospitalization, and will be treated with PRN medication as needed throughout hospitalization. Outcome: Progressing Towards Goal  Note: Pt takes medications as ordered. Patient has been mainly in his room resting today. Patient denies suicidal ideations and complained of a toothache earlier but no withdrawal symptoms. Patient has been appropriate and denies any concerns. Patient just came to the desk and asked for medication for cravings and withdrawal. Patient scored a 5 on CIWA and was medicated with Ativan 1mg and given Haldol for complaints of hallucinations.  Patient went back to his room to rest.

## 2021-02-14 LAB
ALBUMIN SERPL-MCNC: 3.4 G/DL (ref 3.4–5)
ALBUMIN/GLOB SERPL: 1 {RATIO} (ref 0.8–1.7)
ALP SERPL-CCNC: 92 U/L (ref 45–117)
ALT SERPL-CCNC: 22 U/L (ref 16–61)
AST SERPL-CCNC: 11 U/L (ref 10–38)
BILIRUB DIRECT SERPL-MCNC: <0.1 MG/DL (ref 0–0.2)
BILIRUB SERPL-MCNC: 0.2 MG/DL (ref 0.2–1)
CHOLEST SERPL-MCNC: 181 MG/DL
GLOBULIN SER CALC-MCNC: 3.4 G/DL (ref 2–4)
HBA1C MFR BLD: 5.7 % (ref 4.2–5.6)
HDLC SERPL-MCNC: 60 MG/DL (ref 40–60)
HDLC SERPL: 3 {RATIO} (ref 0–5)
LDLC SERPL CALC-MCNC: 102.6 MG/DL (ref 0–100)
LIPID PROFILE,FLP: ABNORMAL
PROT SERPL-MCNC: 6.8 G/DL (ref 6.4–8.2)
TRIGL SERPL-MCNC: 92 MG/DL (ref ?–150)
TSH SERPL DL<=0.05 MIU/L-ACNC: 2.46 UIU/ML (ref 0.36–3.74)
VLDLC SERPL CALC-MCNC: 18.4 MG/DL

## 2021-02-14 PROCEDURE — 65220000003 HC RM SEMIPRIVATE PSYCH

## 2021-02-14 PROCEDURE — 36415 COLL VENOUS BLD VENIPUNCTURE: CPT

## 2021-02-14 PROCEDURE — 74011250637 HC RX REV CODE- 250/637: Performed by: PSYCHIATRY & NEUROLOGY

## 2021-02-14 PROCEDURE — 84443 ASSAY THYROID STIM HORMONE: CPT

## 2021-02-14 PROCEDURE — 83036 HEMOGLOBIN GLYCOSYLATED A1C: CPT

## 2021-02-14 PROCEDURE — 80076 HEPATIC FUNCTION PANEL: CPT

## 2021-02-14 PROCEDURE — 80061 LIPID PANEL: CPT

## 2021-02-14 PROCEDURE — 99231 SBSQ HOSP IP/OBS SF/LOW 25: CPT | Performed by: PSYCHIATRY & NEUROLOGY

## 2021-02-14 RX ADMIN — ACETAMINOPHEN 650 MG: 325 TABLET ORAL at 16:12

## 2021-02-14 RX ADMIN — FOLIC ACID 1 MG: 1 TABLET ORAL at 10:07

## 2021-02-14 RX ADMIN — Medication 100 MG: at 10:07

## 2021-02-14 RX ADMIN — ACETAMINOPHEN 650 MG: 325 TABLET ORAL at 21:18

## 2021-02-14 RX ADMIN — TRAZODONE HYDROCHLORIDE 150 MG: 100 TABLET ORAL at 20:36

## 2021-02-14 RX ADMIN — THERA TABS 1 TABLET: TAB at 10:07

## 2021-02-14 RX ADMIN — HYDROXYZINE PAMOATE 50 MG: 50 CAPSULE ORAL at 20:38

## 2021-02-14 RX ADMIN — RISPERIDONE 1 MG: 1 TABLET ORAL at 20:36

## 2021-02-14 RX ADMIN — BUPROPION HYDROCHLORIDE 150 MG: 150 TABLET, FILM COATED, EXTENDED RELEASE ORAL at 10:07

## 2021-02-14 NOTE — GROUP NOTE
[facility] GROUP DOCUMENTATION INDIVIDUAL Group Therapy Note Date: 2/14/2021 Group Start Time: 4339 Group End Time: 1200 Group Topic: Nursing [facility] 1 SPECIAL TRTMT 1 Brennen Maier RN IP BH GROUP DOCUMENTATION GROUP Group Therapy Note Attendees: 2 Attendance: Did not attend Jessie Barbosa RN

## 2021-02-14 NOTE — BH NOTES
Patient was isolative to his room majority of this shift. Patient minimally engaged with others, but did not initiate interaction. He was pleasant with staff and others and compliant with unit rules. He did come out for his meals and groups. No behavior issues or concerns to report. Staff will continue to monitor patient for safety.

## 2021-02-15 PROCEDURE — 65220000003 HC RM SEMIPRIVATE PSYCH

## 2021-02-15 PROCEDURE — 74011250637 HC RX REV CODE- 250/637: Performed by: PSYCHIATRY & NEUROLOGY

## 2021-02-15 PROCEDURE — 99232 SBSQ HOSP IP/OBS MODERATE 35: CPT | Performed by: PSYCHIATRY & NEUROLOGY

## 2021-02-15 RX ORDER — RISPERIDONE 2 MG/1
2 TABLET, FILM COATED ORAL
Status: DISCONTINUED | OUTPATIENT
Start: 2021-02-15 | End: 2021-02-17 | Stop reason: HOSPADM

## 2021-02-15 RX ADMIN — FOLIC ACID 1 MG: 1 TABLET ORAL at 08:09

## 2021-02-15 RX ADMIN — BUPROPION HYDROCHLORIDE 150 MG: 150 TABLET, FILM COATED, EXTENDED RELEASE ORAL at 08:08

## 2021-02-15 RX ADMIN — Medication 100 MG: at 08:08

## 2021-02-15 RX ADMIN — RISPERIDONE 2 MG: 2 TABLET ORAL at 20:25

## 2021-02-15 RX ADMIN — TRAZODONE HYDROCHLORIDE 150 MG: 100 TABLET ORAL at 20:24

## 2021-02-15 RX ADMIN — THERA TABS 1 TABLET: TAB at 08:08

## 2021-02-15 NOTE — PROGRESS NOTES
Problem: Falls - Risk of  Goal: *Absence of Falls  Description: Document Gabe Sol Fall Risk and appropriate interventions in the flowsheet every shift, and will be free of falls throughout hospitalization. Outcome: Progressing Towards Goal  Note: Fall Risk Interventions:            Medication Interventions: Teach patient to arise slowly                   Problem: Suicide  Goal: *STG: Remains safe in hospital  Description: Pt will be free of SI, intent, or plan as well as self-injurious behavior every shift throughout hospitalization. Outcome: Progressing Towards Goal  Goal: *STG: Attends activities and groups  Description: Pt will attend 3 out of 5 groups daily throughout hospitalization. Outcome: Progressing Towards Goal  Goal: *STG/LTG: Complies with medication therapy  Description: Pt will take medications as prescribed every shift throughout hospitalization, and will be treated with PRN medication as needed throughout hospitalization. Outcome: Progressing Towards Goal  Patient stated he was feeling better. Denies any problems with withdrawal. Patient states he was hearing voices. Medication compliant.

## 2021-02-15 NOTE — BSMART NOTE
OCCUPATIONAL THERAPY PROGRESS NOTE Group Time:  1400 Attendance: The patient attended 3/4 of group. Called out. Participation: The patient participated fully in the activity. Attention: The patient was able to focus on the activity. Interaction: The patient occasionally  interacts with others. . 
Able to participate in focused and appropriate manner in ID of changes to work onl

## 2021-02-15 NOTE — BSMART NOTE
SOCIAL WORK GROUP THERAPY PROGRESS NOTE Group Time:  10:15am   
 
Group Topic:  Coping Skills    C D Issues Group Participation:   
 
Pt moderately involved during group discussion and remained attentive. Discussion included the process of making \"Change\" by answering questions on handout with an emphasis on strengths & weaknesses to support improving one's self esteem. Commented he was proud of his spirituality, friendliness & tries to do the \"right thing\". Taught client about relationship between mood disorders & self medicating them.

## 2021-02-15 NOTE — PROGRESS NOTES
Problem: Falls - Risk of  Goal: *Absence of Falls  Description: Document Junoir Reason Fall Risk and appropriate interventions in the flowsheet every shift, and will be free of falls throughout hospitalization. Outcome: Progressing Towards Goal  Note: Fall Risk Interventions:            Medication Interventions: Teach patient to arise slowly              Aeb pt free of falls this shift     Problem: Suicide  Goal: *STG: Remains safe in hospital  Description: Pt will be free of SI, intent, or plan as well as self-injurious behavior every shift throughout hospitalization. Outcome: Progressing Towards Goal  Note: Aeb pt free of harm this shift  Goal: *STG/LTG: Complies with medication therapy  Description: Pt will take medications as prescribed every shift throughout hospitalization, and will be treated with PRN medication as needed throughout hospitalization. Outcome: Progressing Towards Goal  Note: Aeb pt taking all meds as prescribed this shift     Pt has been calm and cooperative. Compliant with meds, received vistaril for anxiety. Educated patient that lorazepam is for EtOH withdrawal and, apart from reported anxiety, does not appear to have outwards sign of EtOH w/d (no longer tremulous, sweating, no agitation). Endorses AH, hears \"voices\" though he cannot describe them and they are not command. Denies SI/HI/VH. Will continue to monitor for safety.

## 2021-02-15 NOTE — BSMART NOTE
Clinical Summary: Lamberto Marrero is a 38 y. o. male with No significant past medical history who presents with c/o suicidal ideation with a plan to \"jump off a bridge\". Pt notes he has felt this way for a year, but it has become worse over the past week.  Notes auditory hallucinations telling him to hurt himself.  Denies HI, visual hallucinations.  Notes crack cocaine use, alcohol use daily.  Notes he was previously admitted to [facility] about a year ago.  Is not currently on any medication. SW Collateral:  Patient has been accepted to services with [facility] and can be admitted on Wednesday. Patient is receptive to services and reports he is grateful for the opportunity. SW will continue with supportive counseling and will assist with discharge as needed.  
 
ARNALDO FerrariE

## 2021-02-15 NOTE — PROGRESS NOTES
[address]  Inpatient Progress Note     Date of Service: 02/15/21  Hospital Day: 4     Subjective/Interval History   02/15/21    Treatment Team Notes:  Notes reviewed and/or discussed and report that Jessie Jamil is a 77-year-old male with history of substance use admitted for suicidal ideation. Patient complained of auditory hallucinations last night. Otherwise, he denied withdrawal symptoms. He has been calm on the milieu and has not had any behavioral problems. Patient interview: Jessie Jamil was interviewed by this writer today. Patient again reported auditory hallucinations last night of a voice telling him to kill himself and to die. He was not able to sleep well last night due to the hallucinations. He said he also felt like something was crawling on his arm. He endorses night sweats and cravings for drugs. He denies nausea, vomiting or diarrhea. He states he is still very interested to go to a substance rehab facility for at least 30 days to help get his drug use under control. He feels that his life is in danger control does not stop using is not able to stop on his own. He is tolerating current medication regimen. Objective     /71, Pulse 88, HR 18    Mental Status Examination     Appearance/Hygiene 38 y.o. BLACK OR  male  Hygiene:  Moderate   Behavior/Social Relatedness Appropriate   Musculoskeletal Gait/Station: appropriate  Tone (flaccid, cogwheeling, spastic): normal  Psychomotor (hyperkinetic, hypokinetic): calm   Involuntary movements (tics, dyskinesias, akathisa, stereotypies): none   Speech   Rate, rhythm, volume, fluency and articulation are appropriate   Mood   less depressed   Affect    appropriate to situation   Thought Process Linear and goal directed   Thought Content and Perceptual Disturbances Denies self-injurious behavior (SIB), suicidal ideation (SI), aggressive behavior or homicidal ideation (HI)    Endorses auditory hallucinations   Sensorium and Cognition  A&Ox4, attention intact, concentration intact memory intact, language use appropriate, and fund of knowledge age appropriate   Insight  improved   Judgment  fair        Assessment/Plan      Psychiatric Diagnoses:   Patient Active Problem List   Diagnosis Code    Major depressive disorder, recurrent episode, severe, with psychosis ([address] F33.3    Alcohol use disorder, severe, dependence ([address] F10.20    Alcohol withdrawal syndrome without complication ([address] X39.784    Cocaine use disorder, severe, dependence ([address] F14.20    Opioid use disorder, moderate, dependence ([address] F11.20     1) Major depressive disorder: Continue Wellbutrin  mg daily. 2) Psychosis: Increase Risperdal to 2 mg at bedtime. 3) Alcohol withdrawal: Continue to monitor. This appears to be resolving. Continue thiamine and folic acid and Ativan as needed. 4) Alcohol use disorder: Start naltrexone 50 mg daily for cravings. 5) Cocaine/opiod use disorders: Continue to encourage abstinence. Referral to be made to long-term residential treatment center.     Mela Garcia MD,     [facility]  Psychiatry

## 2021-02-16 PROCEDURE — 65220000003 HC RM SEMIPRIVATE PSYCH

## 2021-02-16 PROCEDURE — 99231 SBSQ HOSP IP/OBS SF/LOW 25: CPT | Performed by: PSYCHIATRY & NEUROLOGY

## 2021-02-16 PROCEDURE — 74011250637 HC RX REV CODE- 250/637: Performed by: PSYCHIATRY & NEUROLOGY

## 2021-02-16 RX ADMIN — FOLIC ACID 1 MG: 1 TABLET ORAL at 08:05

## 2021-02-16 RX ADMIN — THERA TABS 1 TABLET: TAB at 08:05

## 2021-02-16 RX ADMIN — BUPROPION HYDROCHLORIDE 150 MG: 150 TABLET, FILM COATED, EXTENDED RELEASE ORAL at 08:05

## 2021-02-16 RX ADMIN — TRAZODONE HYDROCHLORIDE 150 MG: 100 TABLET ORAL at 20:21

## 2021-02-16 RX ADMIN — RISPERIDONE 2 MG: 2 TABLET ORAL at 20:21

## 2021-02-16 RX ADMIN — Medication 100 MG: at 08:05

## 2021-02-16 NOTE — GROUP NOTE
[facility] GROUP DOCUMENTATION INDIVIDUAL Group Therapy Note Date: 2/15/2021 Group Start Time: 1 Group End Time: 1945 Group Topic: Nursing [address] SPECIAL TRTMT 1 Amparo Vidal RN 
 
[facility] GROUP DOCUMENTATION GROUP Group Therapy Note Attendees: 5 Attendance: Did not attend Additional Notes:  Pt. invited to attend the group x 2 but refused each time.  
 
Chelsie Del Real RN

## 2021-02-16 NOTE — BH NOTES
Patient was calm, cooperative, and pleasant during shift. Patient participated in groups and interacted appropriately with staff and peers. Will continue to monitor.

## 2021-02-16 NOTE — BSMART NOTE
ART THERAPY GROUP PROGRESS NOTE PATIENT SCHEDULED FOR GROUP AT: 7054 ATTENDANCE: Full PARTICIPATION LEVEL: Participates fully in the art process ATTENTION LEVEL : Able to focus on task FOCUS: Grounding SYMBOLIC & THEMATIC CONTENT AS NOTED IN IMAGERY: He was cheerful, alert, and compliant. He was invested in the task at hand and his approach to task was planned-out and purposeful. His associations were organized and relevant. He expressed excitement and motivation regarding his anticipated discharge and start with an inpatient rehabilitation program, claiming he had never been a part of in-patient regarding his substance use. He was hopeful in trying something new to obtain sobriety.

## 2021-02-16 NOTE — BSMART NOTE
Clinical Summary:  Ila Marrero is a 38 y. o. male with No significant past medical history who presents with c/o suicidal ideation with a plan to \"jump off a bridge\". Pt notes he has felt this way for a year, but it has become worse over the past week.  Notes auditory hallucinations telling him to hurt himself.  Denies HI, visual hallucinations.  Notes crack cocaine use, alcohol use daily.  Notes he was previously admitted to [facility] about a year ago.  Is not currently on any medication. SW made contact with patient as he is engaged with peers in the milieu. Patient is polite and expressed no major issues or concerns. SW addressed discharge plan and informed patient of discharge scheduled for tomorrow @ noon. Staff from placement will provide transportation. SW will continue supportive counseling and will assist as needed. Enedelia Joya LCSW-WANDA

## 2021-02-16 NOTE — PROGRESS NOTES
Problem: Suicide  Goal: *STG: Remains safe in hospital  Description: Pt will be free of SI, intent, or plan as well as self-injurious behavior every shift throughout hospitalization. Outcome: Progressing Towards Goal  Goal: *STG: Attends activities and groups  Description: Pt will attend 3 out of 5 groups daily throughout hospitalization. Outcome: Progressing Towards Goal  Goal: *STG/LTG: Complies with medication therapy  Description: Pt will take medications as prescribed every shift throughout hospitalization, and will be treated with PRN medication as needed throughout hospitalization. Outcome: Progressing Towards Goal     Problem: Altered Thought Process (Adult/Pediatric)  Goal: *STG: Decreased delusional thinking  Description: Pt will be free of delusional thinking every shift throughout hospitalization. Outcome: Progressing Towards Goal  Goal: *STG: Decreased hallucinations  Description: Pt will be free of hallucinations every shift throughout hospitalization. Outcome: Progressing Towards Goal   Patient  denies SI. He denies auditory and visual hallucinations.  Patient states he is not exhibiting any symptoms of withdrawal.

## 2021-02-16 NOTE — GROUP NOTE
[facility]  GROUP DOCUMENTATION INDIVIDUAL Group Therapy Note Date: 2/16/2021 Group Start Time: 5698 Group End Time: 5855 Group Topic: Medication [facility] 1 SPECIAL TRTMT 1 TRIP [place]a  GROUP DOCUMENTATION GROUP Group Therapy Note Attendees: 4 Attendance: Did not attend group despite several offers of invitation. Juanito Tran.  Jen Bejarano

## 2021-02-16 NOTE — PROGRESS NOTES
[address]  Inpatient Progress Note     Date of Service: 02/16/21  Hospital Day: 5     Subjective/Interval History   02/16/21    Treatment Team Notes:  Notes reviewed and/or discussed and report that Taylor Quan is a 50-year-old male with history of substance use admitted for suicidal ideation. He has been calm on the milieu and has not had any behavioral problems. Patient interview: Taylor Quan was interviewed by this writer today. The patient reports significant improvement in mood today. He slept about 7 hours last night. He denies auditory hallucinations or suicidal ideations. He denies any more withdrawal symptoms. He is tolerating current medication regimen well. The plan is for him to go to the crisis stabilization unit in [facility] tomorrow. From there he will transition to the [address] [place] [address] rehab facility in [facility]. The patient mentioned that he has a court date  on March 16. Objective     /71, Pulse 88, HR 18    Mental Status Examination     Appearance/Hygiene 38 y.o. BLACK OR  male  Hygiene:  Moderate   Behavior/Social Relatedness Appropriate   Musculoskeletal Gait/Station: appropriate  Tone (flaccid, cogwheeling, spastic): normal  Psychomotor (hyperkinetic, hypokinetic): calm   Involuntary movements (tics, dyskinesias, akathisa, stereotypies): none   Speech   Rate, rhythm, volume, fluency and articulation are appropriate   Mood   euthymic   Affect    appropriate to situation   Thought Process Linear and goal directed   Thought Content and Perceptual Disturbances Denies self-injurious behavior (SIB), suicidal ideation (SI), aggressive behavior or homicidal ideation (HI)    Denies auditory hallucinations   Sensorium and Cognition  A&Ox4, attention intact, concentration intact memory intact, language use appropriate, and fund of knowledge age appropriate   Insight  improved   Judgment  fair        Assessment/Plan      Psychiatric Diagnoses:   Patient Active Problem List   Diagnosis Code    Major depressive disorder, recurrent episode, severe, with psychosis ([address] F33.3    Alcohol use disorder, severe, dependence ([address] F10.20    Alcohol withdrawal syndrome without complication ([address] E30.138    Cocaine use disorder, severe, dependence ([address] F14.20    Opioid use disorder, moderate, dependence ([address] F11.20     1) Major depressive disorder: Continue Wellbutrin  mg daily. 2) Psychosis: Continue Risperdal to 2 mg at bedtime. 3) Alcohol withdrawal: Continue to monitor. This appears to be resolving. Continue thiamine and folic acid and Ativan as needed. 4) Alcohol use disorder: Continue naltrexone 50 mg daily for cravings. 5) Cocaine/opiod use disorders: Continue to encourage abstinence. Referral to be made to long-term residential treatment center. 6) Discharge planning for tomorrow.     Anant Navarro MD,    [facility]  Psychiatry

## 2021-02-16 NOTE — BSMART NOTE
ART THERAPY GROUP PROGRESS NOTE PATIENT SCHEDULED FOR GROUP AT: 4790 ATTENDANCE: Full PARTICIPATION LEVEL: Participates fully in the art process ATTENTION LEVEL :Able to focus on task FOCUS:Strengths and treatment goals SYMBOLIC & THEMATIC CONTENT AS NOTED IN IMAGERY: He was calm, compliant, and engaged in the task at hand. He participated in group discussion when prompted. He claimed he needs to work on [employer] brave\" in regards to treatment and recovery. Verbalizations were general.  
 
Group administered by art therapy intern.

## 2021-02-17 VITALS
RESPIRATION RATE: 18 BRPM | DIASTOLIC BLOOD PRESSURE: 77 MMHG | HEART RATE: 87 BPM | TEMPERATURE: 97.2 F | SYSTOLIC BLOOD PRESSURE: 122 MMHG | OXYGEN SATURATION: 99 %

## 2021-02-17 PROCEDURE — 74011250637 HC RX REV CODE- 250/637: Performed by: PSYCHIATRY & NEUROLOGY

## 2021-02-17 PROCEDURE — 99239 HOSP IP/OBS DSCHRG MGMT >30: CPT | Performed by: PSYCHIATRY & NEUROLOGY

## 2021-02-17 RX ORDER — TRAMADOL HYDROCHLORIDE 50 MG/1
50 TABLET ORAL
Qty: 12 TAB | Refills: 0 | Status: SHIPPED | OUTPATIENT
Start: 2021-02-17 | End: 2021-02-20

## 2021-02-17 RX ORDER — TRAMADOL HYDROCHLORIDE 50 MG/1
50 TABLET ORAL
Status: COMPLETED | OUTPATIENT
Start: 2021-02-17 | End: 2021-02-17

## 2021-02-17 RX ORDER — BUPROPION HYDROCHLORIDE 150 MG/1
150 TABLET ORAL DAILY
Qty: 30 TAB | Refills: 0 | Status: SHIPPED | OUTPATIENT
Start: 2021-02-18

## 2021-02-17 RX ORDER — AMOXICILLIN AND CLAVULANATE POTASSIUM 875; 125 MG/1; MG/1
1 TABLET, FILM COATED ORAL EVERY 12 HOURS
Qty: 20 TAB | Refills: 0 | Status: SHIPPED | OUTPATIENT
Start: 2021-02-17 | End: 2021-02-27

## 2021-02-17 RX ORDER — AMOXICILLIN AND CLAVULANATE POTASSIUM 875; 125 MG/1; MG/1
1 TABLET, FILM COATED ORAL EVERY 12 HOURS
Status: DISCONTINUED | OUTPATIENT
Start: 2021-02-17 | End: 2021-02-17 | Stop reason: HOSPADM

## 2021-02-17 RX ORDER — TRAZODONE HYDROCHLORIDE 100 MG/1
200 TABLET ORAL
Qty: 60 TAB | Refills: 0 | Status: SHIPPED | OUTPATIENT
Start: 2021-02-17

## 2021-02-17 RX ORDER — RISPERIDONE 2 MG/1
2 TABLET, FILM COATED ORAL
Qty: 30 TAB | Refills: 0 | Status: SHIPPED | OUTPATIENT
Start: 2021-02-17 | End: 2021-05-06

## 2021-02-17 RX ADMIN — ACETAMINOPHEN 650 MG: 325 TABLET ORAL at 03:15

## 2021-02-17 RX ADMIN — THERA TABS 1 TABLET: TAB at 08:14

## 2021-02-17 RX ADMIN — Medication 100 MG: at 08:13

## 2021-02-17 RX ADMIN — BUPROPION HYDROCHLORIDE 150 MG: 150 TABLET, FILM COATED, EXTENDED RELEASE ORAL at 08:13

## 2021-02-17 RX ADMIN — TRAMADOL HYDROCHLORIDE 50 MG: 50 TABLET, COATED ORAL at 11:59

## 2021-02-17 RX ADMIN — FOLIC ACID 1 MG: 1 TABLET ORAL at 08:14

## 2021-02-17 NOTE — GROUP NOTE
DOR[facility]  GROUP DOCUMENTATION INDIVIDUAL Group Therapy Note Date: 2/17/2021 Group Start Time: 0830 Group End Time: 3988 Group Topic: Nursing [facility] 1 SPECIAL TRTMT 1 TRIP [place]  GROUP DOCUMENTATION GROUP Group Therapy Note Attendees: 5 Attendance: Did not attend Prince Zarate RN

## 2021-02-17 NOTE — PROGRESS NOTES
Patient received discharge instructions. verbalized understanding . Patient decline flu vaccine. All personal  Items returned to patient.

## 2021-02-17 NOTE — BH NOTES
Patient has been alert and oriented x 4.  Patient reported tooth pain and that cold water makes it better.  Patient compliant with medication and was assessed for any difficulty with sleep.  Patient reported that he has been taking a sleeping pill to help him sleep.  PRN medication offered and accepted. Will continue to monitor and support as needed.

## 2021-02-17 NOTE — SUICIDE SAFETY PLAN
SAFETY PLAN    A suicide Safety Plan is a document that supports someone when they are having thoughts of suicide. Warning Signs that indicate a suicidal crisis may be developing: What (situations, thoughts, feelings, body sensations, behaviors, etc.) do you experience that lets you know you are beginning to think about suicide? 1. isolate  2. withdraw      Internal Coping Strategies:  What things can I do (relaxation techniques, hobbies, physical activities, etc.) to take my mind off my problems without contacting another person? 1. Listen to music  2. Go for a walk      People and social settings that provide distraction: Who can I call or where can I go to distract me? 1. Name family   2. Name: friends   3. Place: go to the park, or beach  , mall              People whom I can ask for help: Who can I call when I need help - for example, friends, family, clergy, someone else? 1. Name: friends                  2. Name: family         Professionals or [facility] agencies I can contact during a crisis: Who can I call for help - for example, my doctor, my psychiatrist, my psychologist, a mental health provider, a suicide hotline? 1. Clinician Name: Family           2.. Suicide Prevention Lifeline: 7-567-003-TALK (1685)      Making the environment safe: How can I make my environment (house/apartment/living space) safer? For example, can I remove guns, medications, and other items? No access to weapons.

## 2021-02-17 NOTE — DISCHARGE INSTRUCTIONS
BEHAVIORAL HEALTH NURSING DISCHARGE NOTE      The following personal items collected during your admission are returned to you:   Dental Appliance:    Vision:    Hearing Aid:    Jewelry:    Clothing: Clothing: Footwear, Pants, Shirt, Sweater, [employer]  Other Valuables:    Valuables sent to safe:        PATIENT INSTRUCTIONS:         The discharge information has been reviewed with the patient. The patient verbalized understanding.         Patient armband removed and shredded

## 2021-02-17 NOTE — BH NOTES
Patient still with pain in mouth, but pain did decrease to patient's stated goal for level of pain on a 0-10  (lowest - highest) scale.

## 2021-02-17 NOTE — PROGRESS NOTES
Problem: Altered Thought Process (Adult/Pediatric)  Goal: *STG: Decreased delusional thinking  Description: Pt will be free of delusional thinking every shift throughout hospitalization. Outcome: Progressing Towards Goal  Note: No delusions noted. Goal: *STG: Decreased hallucinations  Description: Pt will be free of hallucinations every shift throughout hospitalization. Outcome: Progressing Towards Goal  Note: Denies AVH. Patient reported, \"I have been awake since change of shift. \"  Patient reported that he has been unable to sleep due to mouth pain from an infected tooth. Patient reported that he doesn't think the sleeping pill is working anymore. Encouraged patient bring concern to the MD during 1:1. Patient agreed with plan. Patient denied SI/HI or AVH at present. Will continue to monitor and support as needed.

## 2021-02-17 NOTE — GROUP NOTE
DOROTHY  GROUP DOCUMENTATION INDIVIDUAL Group Therapy Note Date: 2/17/2021 Group Start Time: 0830 Group End Time: 4371 Group Topic: Nursing [facility] SPECIAL TRTMT 1 TRIP Castillo  GROUP DOCUMENTATION GROUP Group Therapy Note Attendees: 5 Attendance: Did not attend Ghassan Candelario RN

## 2021-02-17 NOTE — BH NOTES
The writer has observed the patient's behavior throughout this shift ([id number]). Patient has been very cooperative and polite with the staff. Patient has been sociable and was observed watching television and engaging in group sessions. In addition, patient has been med compliant. Patient has not displayed any behavior issues and will continue to monitor the patient's safety and safety locations.

## 2021-02-22 NOTE — DISCHARGE SUMMARY
[facility]  Inpatient Psychiatry   Discharge Summary     Admit date: 2/11/2021    Discharge date and time: 2/17/2021 12:16 PM    Discharge Physician: Arden Gates MD    DISCHARGE DIAGNOSES     Psychiatric Diagnoses:   Patient Active Problem List   Diagnosis Code    Major depressive disorder, recurrent episode, severe, with psychosis ([facility] [address] [address] F33.3    Alcohol use disorder, severe, dependence ([address] F10.20    Alcohol withdrawal syndrome without complication ([address] C57.057    Cocaine use disorder, severe, dependence ([facility] [address] F14.20    Opioid use disorder, moderate, dependence ([facility] [address] F11.20       Level of impairment/disability:  [address] [facility] with [facility] Rhina Apley is a 38 y.o. [address] male with a history of multiple substance use disorders (cocaine, alcohol, cannabis, heroin, meth) who was admitted voluntarily from [facility]"" for suicidal ideation with plan to jump off a bridge. Patient also reported auditory hallucinations of voices telling him to kill himself, telling him he was worthless and had nothing to live for. He was seen by psychiatrist virtually at [facility]\"" and inpatient hospitalization was recommended. His UDS was positive for cocaine and THC. BAL <3.     The patient reports that he has been using drugs for several years. He started using cocaine at the age of 11. Cocaine is his drug of choice. He currently is using cocaine daily, about $250 worth daily. Last use was 2 days ago. He also drinks alcohol \" every day, all day\". His last drink was 2 days ago. He usually drinks at least 24 beers plus a fifth of liquor. He started drinking at the age of 14. He says he is currently experiencing withdrawal symptoms such as tremors, night sweats, insomnia and vomiting. He denies ever having seizures due to alcohol withdrawal.  He is also seeing \"flashes of light\".   Patient states he started using meth about a year ago but has been using more frequently in the past 2 months. He uses at least $100 worth every 2 to 3 days. He last used 3 days ago. He also started using heroin about a year ago and is using 2 caps daily. Last used 3 days ago. Patient also smokes marijuana and started at the age of 14. He also smokes 1 pack of cigarettes daily. The patient reports that about a year ago he was introduced to meth and heroin because \"it was free\" and he had easy access. He started using it to replace cocaine when he was not able to afford cocaine. 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 The patient went to [facility] detox for 7 days a year ago. He says he was referred to outpatient treatment at [facility] [place] [address] which he tried to do but found that most of the people there were drinking and using drugs and it was a trigger for him. He says he did not find outpatient treatment helpful and he needs to go to a residential inpatient treatment facility for treatment of his substance use disorders.     Mr. Vinny Raya reports that he has been feeling depressed for the past 3 years. Depressed mood began when he broke up with his girlfriend 3 years ago and also had limited access to his son. He has a 49-year-old son whom he says he is not in contact with because of his lifestyle. He has a lot of guilt and shame about his drug addiction and feels that he cannot be a part of his son's life in his current state. However, he says that his son is his motivation and he is tired of doing drugs and his current lifestyle. He wants to stop so that he can be there for his son. The patient endorses trouble with sleep. He says in the past has been prescribed trazodone 150 mg which helps with insomnia. He also complains of fatigue, feelings of worthlessness and helplessness. Hospital course: Patient admitted and treated for alcohol withdrawal, suicidal ideations, hallucinations. Patient received individual, group and medication therapy. He was monitored for alcohol withdrawal with SHANNON and received Ativan as needed based on withdrawal symptoms. He underwent uncomplicated detox from alcohol. Wellbutrin  mg was prescribed for depression which he tolerated well. He complained of auditory hallucinations which were treated with risperidone 2 mg at bedtime with complete resolution of auditory hallucinations. His mood improved and hallucinations resolved during hospital course. He was really motivated for long-term residential treatment for substance use disorders. He was referred to the crisis stabilization unit in [place] for stepdown while waiting for a bed to open up at the [facility] in [place]. He also has a plan to move to a sober living house once done with the program in [facility]. The patient was pleasant, cooperative and invested in treatment plan. He was not a behavioral problem. He did not require seclusion or restraints. Suicidal ideations resolved once admitted. He really appeared to be motivated to stop using recreational drugs and alcohol. No SI, HI, ratna or psychosis at time of discharge. DISPOSITION/FOLLOW-UP     Disposition: CSU in [facility]    Follow-up Appointments: Follow-up Information     Follow up With Specialties Details Why Contact Info        Patient is discharged to [facility]  [address] [facility] [address]  [phone]:SXJGAGE   [phone]:YQZ              MEDICATION CHANGES   Outpatient medications:  No current facility-administered medications on file prior to encounter. No current outpatient medications on file prior to encounter. Discharged medication:  Discharge Medication List as of 2/17/2021 11:29 AM      START taking these medications    Details   buPROPion XL (WELLBUTRIN XL) 150 mg tablet Take 1 Tab by mouth daily.  Indications: major depressive disorder, Print, Disp-30 Tab, R-0      risperiDONE (RisperDAL) 2 mg tablet Take 1 Tab by mouth nightly. Indications: additional medications to treat depression, Print, Disp-30 Tab, R-0      amoxicillin-clavulanate (Augmentin) 875-125 mg per tablet Take 1 Tab by mouth every twelve (12) hours for 10 days. Indications: tooth infection, Print, Disp-20 Tab, R-0      traZODone (DESYREL) 100 mg tablet Take 2 Tabs by mouth nightly. Indications: insomnia associated with depression, Print, Disp-60 Tab, R-0      traMADoL (Ultram) 50 mg tablet Take 1 Tab by mouth every six (6) hours as needed for Pain for up to 3 days. Max Daily Amount: 200 mg. Indications: pain, toothache, Print, Disp-12 Tab, R-0             Instructions, risks (black box warning), benefits and side effects (EPS, TD, NMS) were discussed in detail prior to discharge. Patient denied any adverse medication side effects prior to discharge. LABS/IMAGING DURING ADMISSION     Results for orders placed or performed during the hospital encounter of 02/11/21   HEMOGLOBIN A1C W/O EAG   Result Value Ref Range    Hemoglobin A1c 5.7 (H) 4.2 - 5.6 %   LIPID PANEL   Result Value Ref Range    LIPID PROFILE          Cholesterol, total 181 <200 MG/DL    Triglyceride 92 <150 MG/DL    HDL Cholesterol 60 40 - 60 MG/DL    LDL, calculated 102.6 (H) 0 - 100 MG/DL    VLDL, calculated 18.4 MG/DL    CHOL/HDL Ratio 3.0 0 - 5.0     TSH 3RD GENERATION   Result Value Ref Range    TSH 2.46 0.36 - 3.74 uIU/mL   HEPATIC FUNCTION PANEL   Result Value Ref Range    Protein, total 6.8 6.4 - 8.2 g/dL    Albumin 3.4 3.4 - 5.0 g/dL    Globulin 3.4 2.0 - 4.0 g/dL    A-G Ratio 1.0 0.8 - 1.7      Bilirubin, total 0.2 0.2 - 1.0 MG/DL    Bilirubin, direct <0.1 0.0 - 0.2 MG/DL    Alk. phosphatase 92 45 - 117 U/L    AST (SGOT) 11 10 - 38 U/L    ALT (SGPT) 22 16 - 61 U/L        DISCHARGE MENTAL STATUS EVALUATION     Appearance/Hygiene 38 y.o.  BLACK male  Hygiene: Good   Attitude/Behavior/Social Relatedness Appropriate   Musculoskeletal Gait/Station: appropriate  Tone (flaccid, cogwheeling, spastic): not assessed  Psychomotor (hyperkinetic, hypokinetic): calm  Involuntary movements (tics, dyskinesias, akathisa, stereotypies): none   Speech   Rate, rhythm, volume, fluency and articulation are appropriate   Mood   euthymic   Affect    congruent   Thought Process Linear and goal directed   Thought Content and Perceptual Disturbances Denies self-injurious behavior (SIB), suicidal ideation (SI), aggressive behavior or homicidal ideation (HI)    Denies auditory and visual hallucinations   Sensorium and Cognition  Grossly intact   Insight  fair   Judgment  good       SUICIDE RISK ASSESSMENT     [] Admission  [x] Discharge     Key Factors:   Current admission precipitated by suicide attempt?   []  Yes     2    [x]  No     1     Suicide Attempt History  [] Past attempts of high lethality    2 []  Past attempts of low lethality    1 [x]  No previous attempts       0   Suicidal Ideation []  Constant suicidal thoughts      2 []  Intermittent or fleeting suicidal  thoughts  1 [x]  Denies current suicidal thoughts    0   Suicide Plan   []  Has plan with actual OR potential access to planned method    2 []  Has plan without access to planned method      1 [x]  No plan            0   Plan Lethality []  Highly lethal plan (Carbon monoxide, gun, hanging, jumping)    2 []  Moderate lethality of plan          1 []  Low lethality of plan (biting, head banging, superficial scratching, pillow over face)  0   Safety Plan Agreement  []  Unwilling OR unable to agree due to impaired reality testing   2   []  Patient is ambivalent and/or guarded      1 [x]  Reliably agrees        0   Current Morbid Thoughts (reunion fantasies, preoccupations with death) []  Constantly     2     []  Frequently    1 [x]  Rarely    0   Elopement Risk  []  High risk     2 []  Moderate risk    1 [x]   Low risk    0   Symptoms    []  Hopeless  [] Helpless  []  Anhedonia   []  Guilt/shame  []  Anger/rage  []  Anxiety  []  Insomnia   []  Agitation   []  Impulsivity  []  5-6 symptoms present    2 []  3-4 symptoms present    1  [x]  0-2 symptoms present    0     Scoring Key:  10 or higher = Imminent Risk (consider 1:1)  4 - 9 = Moderate Risk (consider q 15 minute observation)Attended alcohol, tobacco, prescription and other drug psychoeducation group.   0 - 3 = Low Risk (consider q 30 minute observation)    Total Score: 1  ------------------------------------------------------------------------------------------------------------------  PLEASE ADDRESS THE FOLLOWING 5 ISSUES     Physician's Subjective Appraisal of Risk (check one):  []  Patient replies not trustworthy: several non-verbal cues. []  Patient replies questionable: trustworthy: at least 1 non-verbal cue. [x]  Patient replies appear trustworthy. Family History of Suicide? []  Yes  [x]  No    Protective measures (select all that apply):  [x]  Successful past responses to stress  [x]  Spiritual/Quaker beliefs  [x]  Capacity for reality testing  []  Positive therapeutic relationships  [x]  Social supports/connections  [x]  Positive coping skills  []  Frustration tolerance/optimism  []  Children or pets in the home  []  Sense of responsibility to family  [x]  Agrees to treatment plan and follow up    Others (list):    High Risk Diagnoses (select all that apply):  [x]  Depression/Bipolar Disorder  [x]  Dual Diagnosis  []  Cardiovascular Disease  []  Schizophrenia  []  Chronic Pain  []  Epilepsy  []  Cancer  []  Personality Disorder  []  HIV/AIDS  []  Multiple Sclerosis    Dangerousness Assessment (Suicide, homicide, property destruction. ..)    Risk Factors reviewed and risk assessed to be:  [] low  [x] low-moderate  [] moderate   [] moderate-high  [] high     Protection factors reviewed and risk assessed to be:  [x] low  [] low-moderate  [] moderate   [] moderate-high  [] high     Response to treatment and risk assessed to be:  [x] low  [] low-moderate  [] moderate   [] moderate-high  [] high     Support reviewed and risk assessed to be:  [x] low  [] low-moderate  [] moderate   [] moderate-high  [] high     Acceptance of Discharge and outpatient treatment reviewed and risk assessed to be:    [x] low  [] low-moderate  [] moderate   [] moderate-high  [] high   Overall risk assessed to be:  [x] low  [] low-moderate  [] moderate   [] moderate-high  [] high     Completion of discharge was greater than 30 minutes. Over 50% of today's discharge was geared towards counseling and coordination of care.           Helena Conroy MD  Psychiatry  [facility]

## 2021-04-23 ENCOUNTER — HOSPITAL ENCOUNTER (EMERGENCY)
Age: 41
Discharge: HOME | End: 2021-04-23
Attending: EMERGENCY MEDICINE
Payer: COMMERCIAL

## 2021-04-23 VITALS
RESPIRATION RATE: 14 BRPM | HEART RATE: 91 BPM | BODY MASS INDEX: 26.93 KG/M2 | DIASTOLIC BLOOD PRESSURE: 86 MMHG | SYSTOLIC BLOOD PRESSURE: 121 MMHG | OXYGEN SATURATION: 98 % | TEMPERATURE: 98.5 F | WEIGHT: 198.85 LBS | HEIGHT: 72 IN

## 2021-04-23 DIAGNOSIS — K08.89 PAIN, DENTAL: Primary | ICD-10-CM

## 2021-04-23 PROCEDURE — 99283 EMERGENCY DEPT VISIT LOW MDM: CPT

## 2021-04-23 PROCEDURE — 74011250637 HC RX REV CODE- 250/637: Performed by: EMERGENCY MEDICINE

## 2021-04-23 RX ORDER — IBUPROFEN 400 MG/1
800 TABLET ORAL
Status: COMPLETED | OUTPATIENT
Start: 2021-04-23 | End: 2021-04-23

## 2021-04-23 RX ORDER — AMOXICILLIN 875 MG/1
875 TABLET, FILM COATED ORAL 2 TIMES DAILY
Qty: 14 TAB | Refills: 0 | Status: SHIPPED | OUTPATIENT
Start: 2021-04-23 | End: 2021-04-30

## 2021-04-23 RX ADMIN — IBUPROFEN 800 MG: 400 TABLET, FILM COATED ORAL at 17:35

## 2021-04-23 NOTE — ED NOTES
Dr. Manolo Maza reviewed discharge instructions with the patient. The patient verbalized understanding. Patient ambulated out of the emergency department without assistance. Patient remains in pain, but is in no apparent distress.

## 2021-04-23 NOTE — ED TRIAGE NOTES
Patient presents to the emergency department reporting left posterior dental pain. Patient reports he has had problems with the tooth for some time, and thinks it has to be pulled. Patient advised of [facility] dental clinic.

## 2021-04-23 NOTE — ED PROVIDER NOTES
The history is provided by the patient. Dental Pain   This is a recurrent problem. The current episode started yesterday. The problem occurs constantly. The problem has not changed since onset. The pain is located in the left lower mouth. The quality of the pain is constant. The pain is moderate. There was no vomiting, no nausea, no fever, no swelling, no chest pain, no shortness of breath, no headaches, no gum redness and no drainage. The patient has no cardiac history. History reviewed. No pertinent past medical history. History reviewed. No pertinent surgical history. History reviewed. No pertinent family history. Social History     Socioeconomic History    Marital status: SINGLE     Spouse name: Not on file    Number of children: Not on file    Years of education: Not on file    Highest education level: Not on file   Occupational History    Not on file   Social Needs    Financial resource strain: Not on file    Food insecurity     Worry: Not on file     Inability: Not on file    Transportation needs     Medical: Not on file     Non-medical: Not on file   Tobacco Use    Smoking status: Current Every Day Smoker     Packs/day: 1.00     Years: 20.00     Pack years: 20.00    Smokeless tobacco: Never Used   Substance and Sexual Activity    Alcohol use:  Yes     Alcohol/week: 1.7 standard drinks     Types: 2 Cans of beer per week     Comment:  2 40's a week    Drug use: No    Sexual activity: Not on file   Lifestyle    Physical activity     Days per week: Not on file     Minutes per session: Not on file    Stress: Not on file   Relationships    Social connections     Talks on phone: Not on file     Gets together: Not on file     Attends Rastafarian service: Not on file     Active member of club or organization: Not on file     Attends meetings of clubs or organizations: Not on file     Relationship status: Not on file    Intimate partner violence     Fear of current or ex partner: Not on file     Emotionally abused: Not on file     Physically abused: Not on file     Forced sexual activity: Not on file   Other Topics Concern    Not on file   Social History Narrative    Not on file         ALLERGIES: Patient has no known allergies. Review of Systems   Constitutional: Negative for activity change, chills and fever. HENT: Positive for dental problem. Negative for nosebleeds, sore throat, trouble swallowing and voice change. Eyes: Negative for visual disturbance. Respiratory: Negative for shortness of breath. Cardiovascular: Negative for chest pain and palpitations. Gastrointestinal: Negative for abdominal pain, constipation, diarrhea and nausea. Genitourinary: Negative for difficulty urinating, dysuria, hematuria and urgency. Musculoskeletal: Negative for back pain, neck pain and neck stiffness. Skin: Negative for color change. Allergic/Immunologic: Negative for immunocompromised state. Neurological: Negative for dizziness, seizures, syncope, weakness, light-headedness, numbness and headaches. Psychiatric/Behavioral: Negative for behavioral problems, confusion, hallucinations, self-injury and suicidal ideas. Vitals:    04/23/21 1714   BP: 121/86   Pulse: 91   Resp: 14   Temp: 98.5 °F (36.9 °C)   SpO2: 98%   Weight: 90.2 kg (198 lb 13.7 oz)   Height: 6' (1.829 m)            Physical Exam  Vitals signs and nursing note reviewed. Constitutional:       General: He is not in acute distress. Appearance: He is well-developed. He is not diaphoretic. HENT:      Head: Atraumatic. Mouth/Throat:      Mouth: No injury, oral lesions or angioedema. Dentition: Abnormal dentition. Does not have dentures. Dental tenderness and dental caries present. No gingival swelling, dental abscesses or gum lesions. Neck:      Trachea: No tracheal deviation.    Cardiovascular:      Comments: Warm and well perfused  Pulmonary:      Effort: Pulmonary effort is normal. No respiratory distress. Musculoskeletal: Normal range of motion. Skin:     General: Skin is warm and dry. Neurological:      Mental Status: He is alert. Coordination: Coordination normal.   Psychiatric:         Behavior: Behavior normal.         Thought Content: Thought content normal.         Judgment: Judgment normal.          MDM     This is a 34-year-old male with past medical history, review of systems, physical exam as above, presenting with complaints of dental pain. Patient states he is in the area for rehab, states a history of chronically poor dentition, previous fracture of tooth in the left lower mouth, acutely worsening last night. Patient states she has been taking NSAIDs with mild relief. Physical exam is remarkable for a well-appearing middle-aged male, in no acute distress with scattered poor dentition, broken teeth, tenderness to palpation without obvious abscess or deformity. Suspect periapical abscess. Plan to provide pain control and prescribe oral antibiotics, referral to [facility] school of dentistry given, as were return precautions.     Procedures

## 2021-05-06 ENCOUNTER — HOSPITAL ENCOUNTER (EMERGENCY)
Age: 41
Discharge: HOME | End: 2021-05-06
Attending: EMERGENCY MEDICINE
Payer: COMMERCIAL

## 2021-05-06 ENCOUNTER — APPOINTMENT (OUTPATIENT)
Dept: GENERAL RADIOLOGY | Age: 41
End: 2021-05-06
Attending: EMERGENCY MEDICINE
Payer: COMMERCIAL

## 2021-05-06 VITALS
HEART RATE: 86 BPM | HEIGHT: 72 IN | BODY MASS INDEX: 27.26 KG/M2 | WEIGHT: 201.28 LBS | OXYGEN SATURATION: 97 % | SYSTOLIC BLOOD PRESSURE: 129 MMHG | RESPIRATION RATE: 18 BRPM | DIASTOLIC BLOOD PRESSURE: 81 MMHG | TEMPERATURE: 97.8 F

## 2021-05-06 DIAGNOSIS — B35.1 ONYCHOMYCOSIS: Primary | ICD-10-CM

## 2021-05-06 PROCEDURE — 73630 X-RAY EXAM OF FOOT: CPT

## 2021-05-06 PROCEDURE — 74011250637 HC RX REV CODE- 250/637: Performed by: EMERGENCY MEDICINE

## 2021-05-06 PROCEDURE — 99283 EMERGENCY DEPT VISIT LOW MDM: CPT

## 2021-05-06 RX ORDER — ASPIRIN 325 MG/1
TABLET, FILM COATED ORAL
COMMUNITY
Start: 2021-03-19

## 2021-05-06 RX ORDER — BUPROPION HYDROCHLORIDE 150 MG/1
150 TABLET, EXTENDED RELEASE ORAL 2 TIMES DAILY
COMMUNITY
Start: 2021-03-18

## 2021-05-06 RX ORDER — FOLIC ACID 1 MG/1
TABLET ORAL
COMMUNITY
Start: 2021-04-28

## 2021-05-06 RX ORDER — BUPRENORPHINE HYDROCHLORIDE, NALOXONE HYDROCHLORIDE 4; 1 MG/1; MG/1
FILM, SOLUBLE BUCCAL; SUBLINGUAL
COMMUNITY
Start: 2021-04-13

## 2021-05-06 RX ORDER — BUPRENORPHINE HYDROCHLORIDE, NALOXONE HYDROCHLORIDE 8; 2 MG/1; MG/1
FILM, SOLUBLE BUCCAL; SUBLINGUAL
COMMUNITY
Start: 2021-03-24

## 2021-05-06 RX ORDER — LORAZEPAM 1 MG/1
TABLET ORAL
COMMUNITY
Start: 2021-04-18

## 2021-05-06 RX ORDER — NICOTINE 11MG/24HR
PATCH, TRANSDERMAL 24 HOURS TRANSDERMAL
COMMUNITY
Start: 2021-03-19

## 2021-05-06 RX ORDER — GABAPENTIN 300 MG/1
CAPSULE ORAL
COMMUNITY
Start: 2021-03-19

## 2021-05-06 RX ORDER — IBUPROFEN 600 MG/1
600 TABLET ORAL
Status: COMPLETED | OUTPATIENT
Start: 2021-05-06 | End: 2021-05-06

## 2021-05-06 RX ORDER — QUETIAPINE FUMARATE 100 MG/1
150 TABLET, FILM COATED ORAL 2 TIMES DAILY
COMMUNITY

## 2021-05-06 RX ADMIN — IBUPROFEN 600 MG: 600 TABLET, FILM COATED ORAL at 18:21

## 2021-05-06 NOTE — ED TRIAGE NOTES
Pt arrives ambulatory to ed with complaints of right second toe pain x two years. Pt states he cannot wear shoes anymore. Deformity noted. Toe nail appears to be infected.

## 2021-05-06 NOTE — Clinical Note
[address] 
[facility] 
[address] [address] 
[phone] Work/School Note Date: 5/6/2021 To Whom It May concern: 
 
Ector Galeazzi was seen and treated today in the emergency room by the following provider(s): 
Attending Provider: Geneva Gustafson MD. Ector Galeazzi is excused from work/school on 5/6/2021 through 5/9/2021. He is medically clear to return to work/school on 5/10/2021. Sincerely, Farhana Ayers MD

## 2021-05-06 NOTE — ED PROVIDER NOTES
25-year-old male history of polysubstance abuse, presenting to the ED for evaluation of pain in his right second toe. Patient reports the pain started 2 years ago however he was leaning on street drugs to mask his pain. He has since gotten clean however since that time, he is experiencing severe pain in his right second toe. He does not recall any trauma but has noted a deformity to the toe for 2 years. The nail has been overgrown also for 2 years. He tells me he shaves it occasionally but never feels he quite is able to clip enough. He denies any swelling. No fevers or chills. No color change to the skin. He is not diabetic. History reviewed. No pertinent past medical history. Past Surgical History:   Procedure Laterality Date    HX ORTHOPAEDIC      NE CHEST SURGERY PROCEDURE UNLISTED           History reviewed. No pertinent family history. Social History     Socioeconomic History    Marital status: SINGLE     Spouse name: Not on file    Number of children: Not on file    Years of education: Not on file    Highest education level: Not on file   Occupational History    Not on file   Social Needs    Financial resource strain: Not on file    Food insecurity     Worry: Not on file     Inability: Not on file    Transportation needs     Medical: Not on file     Non-medical: Not on file   Tobacco Use    Smoking status: Current Every Day Smoker     Packs/day: 1.00     Years: 20.00     Pack years: 20.00    Smokeless tobacco: Never Used   Substance and Sexual Activity    Alcohol use:  Yes     Alcohol/week: 1.7 standard drinks     Types: 2 Cans of beer per week     Comment: quit drinking 60 days ago    Drug use: No    Sexual activity: Not on file   Lifestyle    Physical activity     Days per week: Not on file     Minutes per session: Not on file    Stress: Not on file   Relationships    Social connections     Talks on phone: Not on file     Gets together: Not on file     Attends Shinto service: Not on file     Active member of club or organization: Not on file     Attends meetings of clubs or organizations: Not on file     Relationship status: Not on file    Intimate partner violence     Fear of current or ex partner: Not on file     Emotionally abused: Not on file     Physically abused: Not on file     Forced sexual activity: Not on file   Other Topics Concern    Not on file   Social History Narrative    Not on file         ALLERGIES: Patient has no known allergies. Review of Systems   Constitutional: Negative for chills and fever. HENT: Negative for congestion and trouble swallowing. Eyes: Negative for visual disturbance. Respiratory: Negative for shortness of breath. Cardiovascular: Negative for chest pain. Gastrointestinal: Negative for abdominal pain, nausea and vomiting. Genitourinary: Negative for dysuria. Musculoskeletal: Negative for back pain. Skin: Negative for rash. Allergic/Immunologic: Negative for immunocompromised state. Neurological: Negative for weakness and headaches. Psychiatric/Behavioral: Negative for confusion. Vitals:    05/06/21 1737   BP: 129/81   Pulse: 86   Resp: 18   Temp: 97.8 °F (36.6 °C)   SpO2: 97%   Weight: 91.3 kg (201 lb 4.5 oz)   Height: 6' (1.829 m)            Physical Exam  Constitutional:       General: He is not in acute distress. Appearance: Normal appearance. HENT:      Head: Normocephalic. Mouth/Throat:      Mouth: Mucous membranes are moist.   Eyes:      General: No scleral icterus. Extraocular Movements: Extraocular movements intact. Cardiovascular:      Pulses:           Dorsalis pedis pulses are 2+ on the right side and 2+ on the left side. Pulmonary:      Effort: Pulmonary effort is normal. No respiratory distress. Musculoskeletal:         General: No deformity. Feet:    Skin:     General: Skin is warm and dry. Coloration: Skin is not jaundiced or pale.       Comments: Right foot and toes are without evidence of infection   Neurological:      General: No focal deficit present. Mental Status: He is alert and oriented to person, place, and time. Mental status is at baseline. Psychiatric:         Mood and Affect: Mood normal.         Behavior: Behavior normal.          MDM  Number of Diagnoses or Management Options  Onychomycosis  Diagnosis management comments: 26-year-old male history of polysubstance abuse, presenting for evaluation of right toe pain, second digit. Exam is notable for an overgrown fungal nail as well as a angular deformity to the second toe. Will obtain x-ray to evaluate for traumatic injury. No evidence of infection to warrant lab work. He would benefit from podiatry referral.       Amount and/or Complexity of Data Reviewed  Tests in the radiology section of CPT®: ordered      ED Course as of May 08 1022   Thu May 06, 2021   1812 No acute process.   Will refer patient to podiatry for nail removal and initiation of antifungal.   XR FOOT RT MIN 3 V [SL]      ED Course User Index  [SL] Cary Pickard MD       Procedures      Signed By: Juan Mccarthy MD     May 8, 2021

## 2022-09-30 ENCOUNTER — APPOINTMENT (OUTPATIENT)
Dept: GENERAL RADIOLOGY | Age: 42
End: 2022-09-30
Attending: EMERGENCY MEDICINE
Payer: COMMERCIAL

## 2022-09-30 ENCOUNTER — HOSPITAL ENCOUNTER (EMERGENCY)
Age: 42
Discharge: HOME | End: 2022-09-30
Attending: EMERGENCY MEDICINE
Payer: COMMERCIAL

## 2022-09-30 VITALS
OXYGEN SATURATION: 98 % | HEART RATE: 87 BPM | TEMPERATURE: 98.7 F | HEIGHT: 72 IN | DIASTOLIC BLOOD PRESSURE: 69 MMHG | SYSTOLIC BLOOD PRESSURE: 109 MMHG | RESPIRATION RATE: 16 BRPM | WEIGHT: 180 LBS | BODY MASS INDEX: 24.38 KG/M2

## 2022-09-30 DIAGNOSIS — M25.561 ACUTE PAIN OF RIGHT KNEE: ICD-10-CM

## 2022-09-30 DIAGNOSIS — M25.461 KNEE EFFUSION, RIGHT: Primary | ICD-10-CM

## 2022-09-30 PROCEDURE — 73562 X-RAY EXAM OF KNEE 3: CPT

## 2022-09-30 PROCEDURE — 96372 THER/PROPH/DIAG INJ SC/IM: CPT

## 2022-09-30 PROCEDURE — 99284 EMERGENCY DEPT VISIT MOD MDM: CPT

## 2022-09-30 PROCEDURE — 74011250637 HC RX REV CODE- 250/637: Performed by: EMERGENCY MEDICINE

## 2022-09-30 PROCEDURE — 74011250636 HC RX REV CODE- 250/636: Performed by: EMERGENCY MEDICINE

## 2022-09-30 RX ORDER — KETOROLAC TROMETHAMINE 30 MG/ML
30 INJECTION, SOLUTION INTRAMUSCULAR; INTRAVENOUS ONCE
Status: COMPLETED | OUTPATIENT
Start: 2022-09-30 | End: 2022-09-30

## 2022-09-30 RX ORDER — TRAMADOL HYDROCHLORIDE 50 MG/1
50 TABLET ORAL
Status: COMPLETED | OUTPATIENT
Start: 2022-09-30 | End: 2022-09-30

## 2022-09-30 RX ORDER — NAPROXEN 500 MG/1
500 TABLET ORAL 2 TIMES DAILY WITH MEALS
Qty: 20 TABLET | Refills: 0 | Status: SHIPPED | OUTPATIENT
Start: 2022-09-30 | End: 2022-10-10

## 2022-09-30 RX ORDER — TRAMADOL HYDROCHLORIDE 50 MG/1
50 TABLET ORAL
Qty: 9 TABLET | Refills: 0 | Status: SHIPPED | OUTPATIENT
Start: 2022-09-30 | End: 2022-10-03

## 2022-09-30 RX ADMIN — KETOROLAC TROMETHAMINE 30 MG: 30 INJECTION, SOLUTION INTRAMUSCULAR at 05:50

## 2022-09-30 RX ADMIN — TRAMADOL HYDROCHLORIDE 50 MG: 50 TABLET ORAL at 06:18

## 2022-09-30 NOTE — ED TRIAGE NOTES
Pt arrives via [facility] from private residence with cc of R knee pain. Pt reports hx of issues with this knee. Pt reports that \"knee popped out of place twice yesterday while walking. \" Pt arrives covered in \"spilled wax after trouble walking\" PTA. Pt denies taking medication for pain relief PTA.

## 2022-09-30 NOTE — ED NOTES
Pt given discharge instructions, patient education, 2 prescriptions, and follow up information. Pt verbalizes understanding. All questions answered. Pt discharged to home in private vehicle, ambulatory. Pt A&Ox4, RA, pain controlled.

## 2022-09-30 NOTE — DISCHARGE INSTRUCTIONS
Today did not show a fracture. Please keep your right leg elevated, apply ice to it for relief. Take tramadol for pain along with Motrin or ibuprofen as needed. Keep the brace on and use crutches until you see orthopedic surgery for further management. Thank you.

## 2022-09-30 NOTE — ED PROVIDER NOTES
Is a 50-year-old male who presents with right knee pain. Patient reports that he has had knee troubles in the remote past.  Yesterday he thinks his knee popped out twice while walking. Patient is able to bear weight on his leg with pain. Denies any inciting trauma. Says that he was not able to sleep due to the pain. Denies any numbness or weakness of his right leg. History reviewed. No pertinent past medical history. Past Surgical History:   Procedure Laterality Date    HX ORTHOPAEDIC      MI CHEST SURGERY PROCEDURE UNLISTED           History reviewed. No pertinent family history. Social History     Socioeconomic History    Marital status: SINGLE     Spouse name: Not on file    Number of children: Not on file    Years of education: Not on file    Highest education level: Not on file   Occupational History    Not on file   Tobacco Use    Smoking status: Every Day     Packs/day: 1.00     Years: 20.00     Pack years: 20.00     Types: Cigarettes    Smokeless tobacco: Never   Substance and Sexual Activity    Alcohol use: Yes     Alcohol/week: 1.7 standard drinks     Types: 2 Cans of beer per week     Comment: quit drinking 60 days ago    Drug use: Not Currently     Types: Cocaine, Methamphetamines    Sexual activity: Not on file   Other Topics Concern    Not on file   Social History Narrative    Not on file     Social Determinants of Health     Financial Resource Strain: Not on file   Food Insecurity: Not on file   Transportation Needs: Not on file   Physical Activity: Not on file   Stress: Not on file   Social Connections: Not on file   Intimate Partner Violence: Not on file   Housing Stability: Not on file         ALLERGIES: Patient has no known allergies. Review of Systems   Constitutional:  Negative for chills and fever. HENT:  Negative for drooling and nosebleeds. Eyes:  Negative for pain and itching. Respiratory:  Negative for choking and stridor.     Cardiovascular:  Negative for leg swelling. Gastrointestinal:  Negative for abdominal pain and rectal pain. Endocrine: Negative for heat intolerance and polyphagia. Genitourinary:  Negative for enuresis and genital sores. Musculoskeletal:  Positive for joint swelling. Negative for arthralgias. Skin:  Negative for color change. Allergic/Immunologic: Negative for immunocompromised state. Neurological:  Negative for tremors and speech difficulty. Hematological:  Negative for adenopathy. Psychiatric/Behavioral:  Negative for dysphoric mood and sleep disturbance. Vitals:    09/30/22 0456   BP: 109/69   Pulse: 87   Resp: 16   Temp: 98.7 °F (37.1 °C)   SpO2: 98%   Weight: 81.6 kg (180 lb)   Height: 6' (1.829 m)            Physical Exam  Vitals and nursing note reviewed. Constitutional:       General: He is not in acute distress. Appearance: He is well-developed. He is not ill-appearing, toxic-appearing or diaphoretic. HENT:      Head: Normocephalic. Nose: Nose normal.   Eyes:      Conjunctiva/sclera: Conjunctivae normal.   Cardiovascular:      Rate and Rhythm: Normal rate and regular rhythm. Pulmonary:      Effort: Pulmonary effort is normal. No respiratory distress. Abdominal:      General: There is no distension. Palpations: Abdomen is soft. Musculoskeletal:         General: Swelling and tenderness present. No deformity. Cervical back: Normal range of motion and neck supple. Right lower leg: No edema. Left lower leg: No edema. Comments: Swelling of right knee with effusion. No overlying erythema. Soft compartments in the right lower extremity. Motor and sensation grossly intact. Skin:     General: Skin is warm and dry. Neurological:      Mental Status: He is alert.       Coordination: Coordination normal.   Psychiatric:         Behavior: Behavior normal.        MDM  Number of Diagnoses or Management Options  Acute pain of right knee  Knee effusion, right  Diagnosis management comments: Occlusion noted on exam.  Extremities neurovascularly intact. Placed in knee immobilizer, crutches and pain medications provided. Discussed rest, icing, elevation and he will follow-up with orthopedic surgery for further management. Procedures    Patient's results have been reviewed with them. Patient and/or family have verbally conveyed their understanding and agreement of the patient's signs, symptoms, diagnosis, treatment and prognosis and additionally agree to follow up as recommended or return to the Emergency Room should their condition change prior to follow-up. Discharge instructions have also been provided to the patient with some educational information regarding their diagnosis as well a list of reasons why they would want to return to the ER prior to their follow-up appointment should their condition change.

## 2024-02-08 NOTE — BH NOTES
Patient presented himself as being quiet and reserved, but cooperative and engaging when approached. During this shift, patient was notably less interactive. Patient was asked if everything was okay, and patient responded by expressing that he has been experiencing some tooth pain, and would rather remain in his bed instead of watching TV out in the milieu. Patient has been compliant with treatment recommendations made, and has no additional issues to report. Patient will continue to be monitored by staff for any change in mood and behavior. There are no Wet Read(s) to document.